# Patient Record
Sex: FEMALE | Race: WHITE | NOT HISPANIC OR LATINO | Employment: OTHER | URBAN - NONMETROPOLITAN AREA
[De-identification: names, ages, dates, MRNs, and addresses within clinical notes are randomized per-mention and may not be internally consistent; named-entity substitution may affect disease eponyms.]

---

## 2017-02-22 NOTE — TELEPHONE ENCOUNTER
Patient had to reschedule appointment for today.  Her next appointment is 3-23-17 and she is requesting refills of her xanax and zoloft.

## 2017-02-23 RX ORDER — SERTRALINE HYDROCHLORIDE 25 MG/1
25 TABLET, FILM COATED ORAL DAILY
Qty: 30 TABLET | Refills: 1 | Status: SHIPPED | OUTPATIENT
Start: 2017-02-23 | End: 2017-03-23 | Stop reason: SDUPTHER

## 2017-02-23 RX ORDER — ALPRAZOLAM 1 MG/1
1 TABLET ORAL 2 TIMES DAILY PRN
Qty: 60 TABLET | Refills: 1 | OUTPATIENT
Start: 2017-02-23 | End: 2017-03-23 | Stop reason: SDUPTHER

## 2017-02-28 ENCOUNTER — DOCUMENTATION (OUTPATIENT)
Dept: PSYCHIATRY | Facility: CLINIC | Age: 82
End: 2017-02-28

## 2017-03-07 ENCOUNTER — HOSPITAL ENCOUNTER (OUTPATIENT)
Dept: HOSPITAL 79 - KOH-I | Age: 82
End: 2017-03-07
Attending: INTERNAL MEDICINE
Payer: MEDICARE

## 2017-03-07 DIAGNOSIS — E11.9: Primary | ICD-10-CM

## 2017-03-07 DIAGNOSIS — R94.4: ICD-10-CM

## 2017-03-23 ENCOUNTER — OFFICE VISIT (OUTPATIENT)
Dept: PSYCHIATRY | Facility: CLINIC | Age: 82
End: 2017-03-23

## 2017-03-23 VITALS
WEIGHT: 112 LBS | SYSTOLIC BLOOD PRESSURE: 124 MMHG | HEIGHT: 65 IN | HEART RATE: 77 BPM | BODY MASS INDEX: 18.66 KG/M2 | DIASTOLIC BLOOD PRESSURE: 67 MMHG

## 2017-03-23 DIAGNOSIS — F41.1 GENERALIZED ANXIETY DISORDER: Primary | ICD-10-CM

## 2017-03-23 DIAGNOSIS — F32.1 MAJOR DEPRESSIVE DISORDER, SINGLE EPISODE, MODERATE (HCC): ICD-10-CM

## 2017-03-23 PROCEDURE — 99213 OFFICE O/P EST LOW 20 MIN: CPT

## 2017-03-23 RX ORDER — MEGESTROL ACETATE 40 MG/ML
SUSPENSION ORAL
Refills: 2 | COMMUNITY
Start: 2017-03-21

## 2017-03-23 RX ORDER — LORATADINE 10 MG/1
TABLET ORAL
Refills: 2 | COMMUNITY
Start: 2017-02-21 | End: 2018-12-19

## 2017-03-23 RX ORDER — SERTRALINE HYDROCHLORIDE 25 MG/1
25 TABLET, FILM COATED ORAL DAILY
Qty: 30 TABLET | Refills: 1 | Status: SHIPPED | OUTPATIENT
Start: 2017-03-23 | End: 2017-09-19 | Stop reason: SDDI

## 2017-03-23 RX ORDER — ALPRAZOLAM 1 MG/1
1 TABLET ORAL 2 TIMES DAILY PRN
Qty: 60 TABLET | Refills: 1
Start: 2017-03-23 | End: 2017-05-24 | Stop reason: SDUPTHER

## 2017-03-23 NOTE — PROGRESS NOTES
"Michael Rivera is a 84 y.o. female who is here today for medication management follow up.    Chief Complaint:  Anxiety and depression    HPI:  History of Present Illness  Still struggles with depression and worries a lot.  She never picked up the Zoloft prescription due to a pharmacy mixup (I sent it to [facility], but she uses [facility] now).  Restorationism is a strong foundation of strength for her. Xanax is the only effective medication she has tried for anxiety, has been on it for decades until PCP left practice recently and she asked me to continue writing them.  She says the Xanax does help some with her anxiety.  I notice that she is repeating herself several times during the interview.  Her daughter-in-law accompanies her today and says she repeats herself a lot.  The family is wondering if she has some Alzheimer's.     Review of Systems   Constitutional: Positive for appetite change.   HENT: Negative.    Respiratory: Negative.    Cardiovascular: Negative.    Gastrointestinal: Negative.    Musculoskeletal: Positive for back pain.       Objective   Physical Exam   Constitutional: She appears well-developed and well-nourished. No distress.   Neurological: She is alert. Coordination and gait normal.   Vitals reviewed.    Blood pressure 124/67, pulse 77, height 65\" (165.1 cm), weight 112 lb (50.8 kg).    No Known Allergies    Current Medications:   Current Outpatient Prescriptions   Medication Sig Dispense Refill   • ALPRAZolam (XANAX) 1 MG tablet Take 1 tablet by mouth 2 (Two) Times a Day As Needed for Anxiety. 60 tablet 1   • loratadine (CLARITIN) 10 MG tablet TAKE 1 Tablet BY MOUTH EVERY DAY  2   • megestrol (MEGACE) 40 MG/ML suspension TAKE 2 teaspoons BY MOUTH TWICE DAILY  2   • sertraline (ZOLOFT) 25 MG tablet Take 1 tablet by mouth Daily. 30 tablet 1     No current facility-administered medications for this visit.        Mental Status Exam:   Appearance: Neatly, casually dressed, good hygeine. " "  Cooperation: Cooperative  Orientation: Ox4  Gait and station stable.   Psychomotor: No psychomotor agitation/retardation, No EPS, No motor tics  Speech: normal rate, amount.  Mood \"a little depressed sometimes\"   Affect: congruent/appropriate.  Thought Content: goal directed, no delusional material present  Thought process: linear, organized.  Suicidality: No SI  Homicidality: No HI   Perception: No AH/VH  Memory is intact for recent and remote events  Concentration: good  Impulse control: good  Insight: fair   Judgement: fair    Assessment/Plan   Problems Addressed this Visit     None      Visit Diagnoses     Generalized anxiety disorder    -  Primary    Relevant Medications    ALPRAZolam (XANAX) 1 MG tablet    sertraline (ZOLOFT) 25 MG tablet    Major depressive disorder, single episode, moderate        Relevant Medications    ALPRAZolam (XANAX) 1 MG tablet    sertraline (ZOLOFT) 25 MG tablet          · Start zoloft 25mg daily for depression/anxiety.  · Continue xanax 1mg BID for anxiety which she has taken for decades.     We discussed risks, benefits, and side effects of the above medication and the patient was agreeable with the plan.    Return in about 2 months (around 5/24/2017) for Next scheduled follow up.  The patient was instructed to call clinic as needed or go to ER if in crisis.           Xenia disclaimer:  Much of this encounter note is an electronic transcription/translation of spoken language to printed text. The electronic translation of spoken language may permit erroneous, or at times, nonsensical words or phrases to be inadvertently transcribed; Although I have reviewed the note for such errors, some may still exist.  "

## 2017-05-24 ENCOUNTER — OFFICE VISIT (OUTPATIENT)
Dept: PSYCHIATRY | Facility: CLINIC | Age: 82
End: 2017-05-24

## 2017-05-24 VITALS
DIASTOLIC BLOOD PRESSURE: 75 MMHG | WEIGHT: 112 LBS | SYSTOLIC BLOOD PRESSURE: 128 MMHG | HEART RATE: 94 BPM | HEIGHT: 65 IN | BODY MASS INDEX: 18.66 KG/M2

## 2017-05-24 DIAGNOSIS — F41.1 GENERALIZED ANXIETY DISORDER: Primary | ICD-10-CM

## 2017-05-24 PROCEDURE — 99213 OFFICE O/P EST LOW 20 MIN: CPT

## 2017-05-24 RX ORDER — TRIPROLIDINE/PSEUDOEPHEDRINE 2.5MG-60MG
TABLET ORAL
Refills: 1 | COMMUNITY
Start: 2017-05-12

## 2017-05-24 RX ORDER — BESIFLOXACIN 6 MG/ML
SUSPENSION OPHTHALMIC
Refills: 1 | COMMUNITY
Start: 2017-05-12

## 2017-05-24 RX ORDER — ACETAMINOPHEN 500 MG
TABLET ORAL
Refills: 0 | COMMUNITY
Start: 2017-05-08

## 2017-05-24 RX ORDER — NEPAFENAC 0.3 %
SUSPENSION, DROPS(FINAL DOSAGE FORM)(ML) OPHTHALMIC (EYE)
Refills: 1 | COMMUNITY
Start: 2017-05-12

## 2017-05-24 RX ORDER — FLUTICASONE PROPIONATE 50 MCG
SPRAY, SUSPENSION (ML) NASAL
Refills: 0 | COMMUNITY
Start: 2017-04-11

## 2017-05-24 RX ORDER — PROMETHAZINE HYDROCHLORIDE 25 MG/1
TABLET ORAL
Refills: 0 | COMMUNITY
Start: 2017-04-11

## 2017-05-24 RX ORDER — CIPROFLOXACIN 250 MG/1
TABLET, FILM COATED ORAL
Refills: 0 | COMMUNITY
Start: 2017-05-19 | End: 2018-12-19

## 2017-05-24 RX ORDER — ALPRAZOLAM 1 MG/1
1 TABLET ORAL 2 TIMES DAILY PRN
Qty: 60 TABLET | Refills: 2
Start: 2017-05-24 | End: 2017-09-19 | Stop reason: SDUPTHER

## 2017-09-19 ENCOUNTER — OFFICE VISIT (OUTPATIENT)
Dept: PSYCHIATRY | Facility: CLINIC | Age: 82
End: 2017-09-19

## 2017-09-19 VITALS
WEIGHT: 106 LBS | BODY MASS INDEX: 17.66 KG/M2 | HEART RATE: 80 BPM | HEIGHT: 65 IN | SYSTOLIC BLOOD PRESSURE: 131 MMHG | DIASTOLIC BLOOD PRESSURE: 68 MMHG

## 2017-09-19 DIAGNOSIS — F41.1 GENERALIZED ANXIETY DISORDER: Primary | ICD-10-CM

## 2017-09-19 DIAGNOSIS — F33.0 MDD (MAJOR DEPRESSIVE DISORDER), RECURRENT EPISODE, MILD (HCC): ICD-10-CM

## 2017-09-19 PROCEDURE — 99213 OFFICE O/P EST LOW 20 MIN: CPT | Performed by: NURSE PRACTITIONER

## 2017-09-19 RX ORDER — ALPRAZOLAM 1 MG/1
1 TABLET ORAL 2 TIMES DAILY PRN
Qty: 60 TABLET | Refills: 0 | Status: SHIPPED | OUTPATIENT
Start: 2017-09-19 | End: 2017-10-23 | Stop reason: SDUPTHER

## 2017-09-19 RX ORDER — AMLODIPINE BESYLATE 5 MG/1
TABLET ORAL
Refills: 2 | COMMUNITY
Start: 2017-08-22

## 2017-09-19 NOTE — PROGRESS NOTES
"Michael Rivera is a 85 y.o. female who is here today for medication management follow up.    Chief Complaint:  Anxiety and depression    HPI:  History of Present Illness  Patient presents for follow up from dr. Gomez.  Patient returns today reporting that she is doing fairly well.  She continues to struggle with anxiety she has been on Xanax at least for the last 40 years.  She adamantly and consistently denies any thoughts to harm herself or others.  She is alert oriented.  She currently states she is highly involved in [employer] and that her relationship with God is her primary strength.  Patient denies any medication related side effects.  Per Dr. Gomez's notes and the patient's report Xanax is the only effective medication she has tried for anxiety, has been on it for decades until PCP left practice recently and she asked me to continue writing them.  She says the Xanax does help some with her anxiety. Her daughter-in-law accompanies her today and says she is doing well.  Patient's weight was noted she appears to have lost 6 pounds over the summer.  She does report difficulty with appetite and states that she \"has a bad hip and the pain makes me not want to eat\".  Patient adamantly denies any thoughts to harm herself or others she reports that she would never get that low.  Long discussion was held with the patient in regards to risk of long-term benzodiazepine use patient was reluctant to attempt other medication she has been stable on Xanax for at least the last 30 years.    Review of Systems   Constitutional: Positive for appetite change.   HENT: Negative.    Respiratory: Negative.    Cardiovascular: Negative.    Gastrointestinal: Negative.    Musculoskeletal: Positive for back pain.       Objective   Physical Exam   Constitutional: She appears well-developed and well-nourished. No distress.   Neurological: She is alert. Coordination and gait normal.   Vitals reviewed.    There were no vitals taken " "for this visit.    No Known Allergies    Current Medications:   Current Outpatient Prescriptions   Medication Sig Dispense Refill   • ALPRAZolam (XANAX) 1 MG tablet Take 1 tablet by mouth 2 (Two) Times a Day As Needed for Anxiety. 60 tablet 2   • amLODIPine (NORVASC) 5 MG tablet TAKE ONE Tablet BY MOUTH EVERY MORNING  2   • BESIVANCE 0.6 % suspension ophthalmic suspension USE 1 drop IN surgical EYE THREE TIMES DAILY  1   • ciprofloxacin (CIPRO) 250 MG tablet TAKE ONE TABLET BY MOUTH EVERY TWELVE HOURS UNTIL GONE  0   • DUREZOL 0.05 % ophthalmic emulsion USE 1 drop IN surgical EYE THREE TIMES DAILY  1   • fluticasone (FLONASE) 50 MCG/ACT nasal spray USE two SPRAYS IN EACH nostril EVERY DAY  0   • ILEVRO 0.3 % suspension USE 1 drop IN surgical EYE EVERY DAY  1   • loratadine (CLARITIN) 10 MG tablet TAKE 1 Tablet BY MOUTH EVERY DAY  2   • MAPAP 500 MG tablet TAKE 1 Tablet BY MOUTH EVERY DAY AS NEEDED FOR PAIN  0   • megestrol (MEGACE) 40 MG/ML suspension TAKE 2 teaspoons BY MOUTH TWICE DAILY  2   • promethazine (PHENERGAN) 25 MG tablet TAKE 1/2 TO 1 tablet BY MOUTH EVERY 4 TO 6 HOURS AS NEEDED  0   • sertraline (ZOLOFT) 25 MG tablet Take 1 tablet by mouth Daily. 30 tablet 1     No current facility-administered medications for this visit.        Mental Status Exam:   Appearance: Neatly, casually dressed, good hygeine.   Cooperation: Cooperative  Orientation: Ox4  Gait and station stable.   Psychomotor: No psychomotor agitation/retardation, No EPS, No motor tics  Speech: normal rate, amount.  Mood \"pretty good\"   Affect: congruent/appropriate.  Thought Content: goal directed, no delusional material present  Thought process: linear, organized.  Suicidality: No SI  Homicidality: No HI   Perception: No AH/VH  Memory is intact for recent and remote events  Concentration: good  Impulse control: good  Insight: fair   Judgement: fair    Assessment/Plan   Problems Addressed this Visit     None      Visit Diagnoses     " Generalized anxiety disorder    -  Primary    Relevant Medications    ALPRAZolam (XANAX) 1 MG tablet    MDD (major depressive disorder), recurrent episode, mild        Relevant Medications    ALPRAZolam (XANAX) 1 MG tablet          · Continue xanax 1mg BID for anxiety which she has taken for decades.    · Watch for any worsening of s/s of dementia.   · She will follow-up with one of our nurse practitioners in about 2 months for medication management.    We discussed risks, benefits, and side effects of the above medication and the patient was agreeable with the plan.    Return in about 2 months (around 11/19/2017).  The patient was instructed to call clinic as needed or go to ER if in crisis.           Xenia disclaimer:  Much of this encounter note is an electronic transcription/translation of spoken language to printed text. The electronic translation of spoken language may permit erroneous, or at times, nonsensical words or phrases to be inadvertently transcribed; Although I have reviewed the note for such errors, some may still exist.

## 2017-10-24 RX ORDER — ALPRAZOLAM 1 MG/1
1 TABLET ORAL 2 TIMES DAILY PRN
Qty: 60 TABLET | Refills: 0 | Status: SHIPPED | OUTPATIENT
Start: 2017-10-24 | End: 2017-11-21 | Stop reason: SDUPTHER

## 2017-10-24 NOTE — TELEPHONE ENCOUNTER
Called in [facility]   Xanax 60 tablets take 1 tablets by mouth 2 times a day 0 refills

## 2017-11-21 ENCOUNTER — TELEPHONE (OUTPATIENT)
Dept: PSYCHIATRY | Facility: CLINIC | Age: 82
End: 2017-11-21

## 2017-11-21 RX ORDER — ALPRAZOLAM 1 MG/1
1 TABLET ORAL 2 TIMES DAILY PRN
Qty: 60 TABLET | Refills: 0 | Status: SHIPPED | OUTPATIENT
Start: 2017-11-21 | End: 2017-12-13 | Stop reason: SDUPTHER

## 2017-12-13 RX ORDER — ALPRAZOLAM 1 MG/1
1 TABLET ORAL 2 TIMES DAILY PRN
Qty: 60 TABLET | Refills: 0 | Status: SHIPPED | OUTPATIENT
Start: 2017-12-13 | End: 2018-01-24 | Stop reason: SDUPTHER

## 2017-12-13 RX ORDER — PREDNISOLONE ACETATE 10 MG/ML
SUSPENSION/ DROPS OPHTHALMIC
Refills: 0 | COMMUNITY
Start: 2017-10-05

## 2018-01-24 ENCOUNTER — DOCUMENTATION (OUTPATIENT)
Dept: PSYCHIATRY | Facility: CLINIC | Age: 83
End: 2018-01-24

## 2018-01-24 ENCOUNTER — OFFICE VISIT (OUTPATIENT)
Dept: PSYCHIATRY | Facility: CLINIC | Age: 83
End: 2018-01-24

## 2018-01-24 VITALS
DIASTOLIC BLOOD PRESSURE: 60 MMHG | WEIGHT: 112 LBS | HEIGHT: 65 IN | BODY MASS INDEX: 18.66 KG/M2 | HEART RATE: 88 BPM | SYSTOLIC BLOOD PRESSURE: 160 MMHG

## 2018-01-24 DIAGNOSIS — F33.0 MDD (MAJOR DEPRESSIVE DISORDER), RECURRENT EPISODE, MILD (HCC): ICD-10-CM

## 2018-01-24 DIAGNOSIS — F41.1 GENERALIZED ANXIETY DISORDER: Primary | ICD-10-CM

## 2018-01-24 PROCEDURE — 99214 OFFICE O/P EST MOD 30 MIN: CPT | Performed by: NURSE PRACTITIONER

## 2018-01-24 RX ORDER — ALPRAZOLAM 1 MG/1
1 TABLET ORAL NIGHTLY
Qty: 30 TABLET | Refills: 0 | Status: SHIPPED | OUTPATIENT
Start: 2018-01-24 | End: 2018-02-21 | Stop reason: SDUPTHER

## 2018-01-24 RX ORDER — ALPRAZOLAM 0.5 MG/1
0.5 TABLET ORAL EVERY MORNING
Qty: 30 TABLET | Refills: 0 | Status: SHIPPED | OUTPATIENT
Start: 2018-01-24 | End: 2018-02-21 | Stop reason: SDUPTHER

## 2018-01-24 NOTE — PROGRESS NOTES
"Michael Rivera is a 85 y.o. female who is here today for medication management follow up.    Chief Complaint:  Anxiety and depression    HPI:  History of Present Illness   Patient states that she had a good Tyron. She presents to the visit with her daughter Marta, whom she lives with. My nerves are \"bad. I stay depressed a lot, and hurts a lot.\" Continues to go to [employer] and stays busy. Shares that she cooks some. Daughter shares that she gets up early, some mornings she will do it herself. During the day she \"piddles in her room\" She is able to care for herself. She seems to be getting forgetful. This has gradually worsened over the past year. She does not take any medication for her memory. Appetite: appetite is good. Sleeps pretty good. Shares that at time she has a hard time falling asleep, she gets up frequently related to her \"kidneys acting up.\" Shares that she has been recently hearing things like her name being called, and is seeing things, such as her daughter coming in her room this morning to wake her up. Denies any new medical problems. Self harm and suicidal thoughts not voiced. There does not seem to be a family history of memory issues. Patient was asked to draw a clock that read 4:00 she anali a [place] and wrote 4 o'clock. She does appear to have memory loss at times.      Review of Systems   Constitutional: Positive for appetite change.   HENT: Negative.    Respiratory: Negative.    Cardiovascular: Negative.    Gastrointestinal: Negative.    Musculoskeletal: Positive for arthralgias, back pain and gait problem.        Patient had difficulty walking to the exam room.      Neurological: Positive for weakness.       Objective   Physical Exam   Constitutional: She appears well-developed and well-nourished. No distress.   Neurological: She is alert. Coordination abnormal. Gait normal.   Skin: Skin is warm and dry.   Vitals reviewed.    Blood pressure 160/60, pulse 88, height 165.1 cm " "(65\"), weight 50.8 kg (112 lb).    No Known Allergies    Current Medications:   Current Outpatient Prescriptions   Medication Sig Dispense Refill   • ALPRAZolam (XANAX) 1 MG tablet Take 1 tablet by mouth Every Night. 30 tablet 0   • amLODIPine (NORVASC) 5 MG tablet TAKE ONE Tablet BY MOUTH EVERY MORNING  2   • BESIVANCE 0.6 % suspension ophthalmic suspension USE 1 drop IN surgical EYE THREE TIMES DAILY  1   • ciprofloxacin (CIPRO) 250 MG tablet TAKE ONE TABLET BY MOUTH EVERY TWELVE HOURS UNTIL GONE  0   • DUREZOL 0.05 % ophthalmic emulsion USE 1 drop IN surgical EYE THREE TIMES DAILY  1   • fluticasone (FLONASE) 50 MCG/ACT nasal spray USE two SPRAYS IN EACH nostril EVERY DAY  0   • ILEVRO 0.3 % suspension USE 1 drop IN surgical EYE EVERY DAY  1   • loratadine (CLARITIN) 10 MG tablet TAKE 1 Tablet BY MOUTH EVERY DAY  2   • MAPAP 500 MG tablet TAKE 1 Tablet BY MOUTH EVERY DAY AS NEEDED FOR PAIN  0   • megestrol (MEGACE) 40 MG/ML suspension TAKE 2 teaspoons BY MOUTH TWICE DAILY  2   • prednisoLONE acetate (PRED FORTE) 1 % ophthalmic suspension instill 1 drop into surgical EYE FOUR TIMES DAILY AS directed  0   • promethazine (PHENERGAN) 25 MG tablet TAKE 1/2 TO 1 tablet BY MOUTH EVERY 4 TO 6 HOURS AS NEEDED  0   • ALPRAZolam (XANAX) 0.5 MG tablet Take 1 tablet by mouth Every Morning. 30 tablet 0     No current facility-administered medications for this visit.        Mental Status Exam:   Appearance: Neatly, casually dressed, good hygeine.   Cooperation: Cooperative  Orientation: Ox4  Gait and station stable.   Psychomotor: No psychomotor agitation/retardation, No EPS, No motor tics  Speech: normal rate, amount.  Mood \"pretty good\"   Affect: congruent/appropriate.  Thought Content: goal directed, no delusional material present  Thought process: linear, organized.  Suicidality: No SI  Homicidality: No HI   Perception: No AH/VH  Memory: some immediate memory appears to impaired.  Concentration: good  Impulse control: " good  Insight: fair   Judgement: fair    Assessment/Plan   Problems Addressed this Visit     None      Visit Diagnoses     Generalized anxiety disorder    -  Primary    Relevant Medications    ALPRAZolam (XANAX) 1 MG tablet    ALPRAZolam (XANAX) 0.5 MG tablet    MDD (major depressive disorder), recurrent episode, mild        Relevant Medications    ALPRAZolam (XANAX) 1 MG tablet    ALPRAZolam (XANAX) 0.5 MG tablet        Patient is becoming forgetful and is very repetitive. She does appear to have memory loss at times. Xanax will be decreased slowly and decreasing morning dose by 0.5 mg. Discussed with daughter the need to rally family and contemplate potential for the need of POA. Daughter is currently with patient 24/7 and is assistanting in financial matters.  Will contact Dr. Richey/jose de jesus regarding aricept samples-beginning treatment with medication to halt/stablize memory.  We discussed risks, benefits, and side effects of the above medication and the patient was agreeable with the plan.    Return in about 4 weeks (around 2/21/2018), or if symptoms worsen or fail to improve, for Next scheduled follow up.  The patient was instructed to call clinic as needed or go to ER if in crisis.           Xenia disclaimer:  Much of this encounter note is an electronic transcription/translation of spoken language to printed text. The electronic translation of spoken language may permit erroneous, or at times, nonsensical words or phrases to be inadvertently transcribed; Although I have reviewed the note for such errors, some may still exist.

## 2018-02-21 ENCOUNTER — OFFICE VISIT (OUTPATIENT)
Dept: PSYCHIATRY | Facility: CLINIC | Age: 83
End: 2018-02-21

## 2018-02-21 VITALS
SYSTOLIC BLOOD PRESSURE: 134 MMHG | BODY MASS INDEX: 18.66 KG/M2 | DIASTOLIC BLOOD PRESSURE: 60 MMHG | HEIGHT: 65 IN | WEIGHT: 112 LBS | HEART RATE: 89 BPM

## 2018-02-21 DIAGNOSIS — F41.1 GENERALIZED ANXIETY DISORDER: Primary | ICD-10-CM

## 2018-02-21 DIAGNOSIS — F33.0 MDD (MAJOR DEPRESSIVE DISORDER), RECURRENT EPISODE, MILD (HCC): ICD-10-CM

## 2018-02-21 PROCEDURE — 99213 OFFICE O/P EST LOW 20 MIN: CPT | Performed by: NURSE PRACTITIONER

## 2018-02-21 RX ORDER — ALPRAZOLAM 1 MG/1
1 TABLET ORAL NIGHTLY
Qty: 30 TABLET | Refills: 0 | Status: SHIPPED | OUTPATIENT
Start: 2018-02-21 | End: 2018-03-20 | Stop reason: SDUPTHER

## 2018-02-21 RX ORDER — ALPRAZOLAM 0.5 MG/1
0.5 TABLET ORAL EVERY MORNING
Qty: 30 TABLET | Refills: 0 | Status: SHIPPED | OUTPATIENT
Start: 2018-02-21 | End: 2018-03-20 | Stop reason: SDUPTHER

## 2018-02-21 RX ORDER — AMOXICILLIN 500 MG/1
CAPSULE ORAL
Refills: 0 | COMMUNITY
Start: 2018-02-06 | End: 2018-12-19

## 2018-02-21 RX ORDER — RANITIDINE 300 MG/1
TABLET ORAL
Refills: 2 | COMMUNITY
Start: 2018-02-06

## 2018-02-21 NOTE — PROGRESS NOTES
"Michael Rivera is a 85 y.o. female who is here today for medication management follow up.    Chief Complaint:  Anxiety and depression    HPI:  History of Present Illness   Patient states that she is doing ok-\"the Lord's help\".  My nerves are \"bad. I worry a lot about my grandson.  Continues to go to [employer] and stays busy. Shares that she cooks some.  The patient does continue to confabulate somewhat she was not receptive to any issues with her memory.  The patient was alert she was oriented to person place and situation time and date.  Patient did know the month however stated that the year \"she didn't keep up with\".  She was denying any memory loss although she does appear times to be repeating herself.  Patient reports that she knows her memory is good because she can't \"remember all my medicine\".  She seems to be getting forgetful.  We did attempt to contact her PCP for possible samples of Aricept attempts were tried ×2.  Daughter has verbalized that patient cannot afford any more medication.  Appetite: appetite is good. Sleeps pretty good.  Patient denies any auditory or visual hallucinations.  She adamantly denied any thoughts to harm herself or others    Review of Systems   Constitutional: Positive for appetite change.   HENT: Negative.    Respiratory: Negative.    Cardiovascular: Negative.    Gastrointestinal: Negative.    Musculoskeletal: Positive for arthralgias, back pain and gait problem.        Patient had difficulty walking to the exam room.      Neurological: Positive for weakness.       Objective   Physical Exam   Constitutional: She appears well-developed and well-nourished. No distress.   Neurological: She is alert. Coordination abnormal. Gait normal.   Skin: Skin is warm and dry.   Vitals reviewed.    Blood pressure 134/60, pulse 89, height 165.1 cm (65\"), weight 50.8 kg (112 lb).    No Known Allergies    Current Medications:   Current Outpatient Prescriptions   Medication Sig Dispense " "Refill   • ALPRAZolam (XANAX) 0.5 MG tablet Take 1 tablet by mouth Every Morning. 30 tablet 0   • ALPRAZolam (XANAX) 1 MG tablet Take 1 tablet by mouth Every Night. 30 tablet 0   • amLODIPine (NORVASC) 5 MG tablet TAKE ONE Tablet BY MOUTH EVERY MORNING  2   • BESIVANCE 0.6 % suspension ophthalmic suspension USE 1 drop IN surgical EYE THREE TIMES DAILY  1   • ciprofloxacin (CIPRO) 250 MG tablet TAKE ONE TABLET BY MOUTH EVERY TWELVE HOURS UNTIL GONE  0   • DUREZOL 0.05 % ophthalmic emulsion USE 1 drop IN surgical EYE THREE TIMES DAILY  1   • fluticasone (FLONASE) 50 MCG/ACT nasal spray USE two SPRAYS IN EACH nostril EVERY DAY  0   • ILEVRO 0.3 % suspension USE 1 drop IN surgical EYE EVERY DAY  1   • loratadine (CLARITIN) 10 MG tablet TAKE 1 Tablet BY MOUTH EVERY DAY  2   • MAPAP 500 MG tablet TAKE 1 Tablet BY MOUTH EVERY DAY AS NEEDED FOR PAIN  0   • megestrol (MEGACE) 40 MG/ML suspension TAKE 2 teaspoons BY MOUTH TWICE DAILY  2   • prednisoLONE acetate (PRED FORTE) 1 % ophthalmic suspension instill 1 drop into surgical EYE FOUR TIMES DAILY AS directed  0   • promethazine (PHENERGAN) 25 MG tablet TAKE 1/2 TO 1 tablet BY MOUTH EVERY 4 TO 6 HOURS AS NEEDED  0     No current facility-administered medications for this visit.        Mental Status Exam:   Appearance: Neatly, casually dressed, good hygeine.   Cooperation: Cooperative  Orientation: Ox4  Gait and station stable.   Psychomotor: No psychomotor agitation/retardation, No EPS, No motor tics  Speech: normal rate, amount.  Mood \"pretty good\"   Affect: congruent/appropriate.  Thought Content: goal directed, no delusional material present  Thought process: linear, organized.  Suicidality: No SI  Homicidality: No HI   Perception: No AH/VH  Memory: some immediate memory appears to impaired.  Concentration: good  Impulse control: good  Insight: fair   Judgement: fair    Assessment/Plan   Problems Addressed this Visit     None      Visit Diagnoses     Generalized anxiety " disorder    -  Primary    Relevant Medications    ALPRAZolam (XANAX) 0.5 MG tablet    ALPRAZolam (XANAX) 1 MG tablet    MDD (major depressive disorder), recurrent episode, mild        Relevant Medications    ALPRAZolam (XANAX) 0.5 MG tablet    ALPRAZolam (XANAX) 1 MG tablet      Xanax has been decreased patient presents currently alone with family in the lobby.  Daughter is currently with patient 24/7 and is assistanting in financial matters.  We discussed risks, benefits, and side effects of the above medication and the patient was agreeable with the plan.    Return in about 3 months (around 5/21/2018).  The patient was instructed to call clinic as needed or go to ER if in crisis.           Xenia disclaimer:  Much of this encounter note is an electronic transcription/translation of spoken language to printed text. The electronic translation of spoken language may permit erroneous, or at times, nonsensical words or phrases to be inadvertently transcribed; Although I have reviewed the note for such errors, some may still exist.

## 2018-03-20 DIAGNOSIS — F41.1 GENERALIZED ANXIETY DISORDER: ICD-10-CM

## 2018-03-20 DIAGNOSIS — F33.0 MDD (MAJOR DEPRESSIVE DISORDER), RECURRENT EPISODE, MILD (HCC): ICD-10-CM

## 2018-03-20 RX ORDER — ALPRAZOLAM 1 MG/1
1 TABLET ORAL NIGHTLY
Qty: 30 TABLET | Refills: 0 | Status: SHIPPED | OUTPATIENT
Start: 2018-03-20 | End: 2018-03-20 | Stop reason: SDUPTHER

## 2018-03-20 RX ORDER — ALPRAZOLAM 0.5 MG/1
0.5 TABLET ORAL EVERY MORNING
Qty: 30 TABLET | Refills: 0 | Status: SHIPPED | OUTPATIENT
Start: 2018-03-20 | End: 2018-04-16 | Stop reason: SDUPTHER

## 2018-03-20 RX ORDER — ALPRAZOLAM 0.5 MG/1
0.5 TABLET ORAL EVERY MORNING
Qty: 30 TABLET | Refills: 0 | Status: SHIPPED | OUTPATIENT
Start: 2018-03-20 | End: 2018-03-20 | Stop reason: SDUPTHER

## 2018-03-20 RX ORDER — ALPRAZOLAM 1 MG/1
1 TABLET ORAL NIGHTLY
Qty: 30 TABLET | Refills: 0 | Status: SHIPPED | OUTPATIENT
Start: 2018-03-20 | End: 2018-04-16 | Stop reason: SDUPTHER

## 2018-04-02 ENCOUNTER — TRANSCRIBE ORDERS (OUTPATIENT)
Dept: ADMINISTRATIVE | Facility: HOSPITAL | Age: 83
End: 2018-04-02

## 2018-04-02 DIAGNOSIS — M47.817 LUMBOSACRAL SPONDYLOSIS WITHOUT MYELOPATHY: Primary | ICD-10-CM

## 2018-04-10 ENCOUNTER — HOSPITAL ENCOUNTER (OUTPATIENT)
Dept: GENERAL RADIOLOGY | Facility: HOSPITAL | Age: 83
Discharge: HOME | End: 2018-04-10
Attending: ANESTHESIOLOGY

## 2018-04-10 ENCOUNTER — HOSPITAL ENCOUNTER (OUTPATIENT)
Dept: MRI IMAGING | Facility: HOSPITAL | Age: 83
Discharge: HOME | End: 2018-04-10
Attending: ANESTHESIOLOGY | Admitting: ANESTHESIOLOGY

## 2018-04-10 ENCOUNTER — TRANSCRIBE ORDERS (OUTPATIENT)
Dept: ADMINISTRATIVE | Facility: HOSPITAL | Age: 83
End: 2018-04-10

## 2018-04-10 DIAGNOSIS — M47.815 OSTEOARTHRITIS OF THORACOLUMBAR SPINE, UNSPECIFIED SPINAL OSTEOARTHRITIS COMPLICATION STATUS: Primary | ICD-10-CM

## 2018-04-10 DIAGNOSIS — M47.817 LUMBOSACRAL SPONDYLOSIS WITHOUT MYELOPATHY: ICD-10-CM

## 2018-04-10 DIAGNOSIS — M47.815 OSTEOARTHRITIS OF THORACOLUMBAR SPINE, UNSPECIFIED SPINAL OSTEOARTHRITIS COMPLICATION STATUS: ICD-10-CM

## 2018-04-10 PROCEDURE — 72100 X-RAY EXAM L-S SPINE 2/3 VWS: CPT

## 2018-04-10 PROCEDURE — 72072 X-RAY EXAM THORAC SPINE 3VWS: CPT

## 2018-04-10 PROCEDURE — 72072 X-RAY EXAM THORAC SPINE 3VWS: CPT | Performed by: RADIOLOGY

## 2018-04-10 PROCEDURE — 72148 MRI LUMBAR SPINE W/O DYE: CPT | Performed by: RADIOLOGY

## 2018-04-10 PROCEDURE — 72100 X-RAY EXAM L-S SPINE 2/3 VWS: CPT | Performed by: RADIOLOGY

## 2018-04-10 PROCEDURE — 72148 MRI LUMBAR SPINE W/O DYE: CPT

## 2018-04-16 DIAGNOSIS — F41.1 GENERALIZED ANXIETY DISORDER: ICD-10-CM

## 2018-04-16 DIAGNOSIS — F33.0 MDD (MAJOR DEPRESSIVE DISORDER), RECURRENT EPISODE, MILD (HCC): ICD-10-CM

## 2018-04-16 RX ORDER — ALPRAZOLAM 1 MG/1
1 TABLET ORAL NIGHTLY
Qty: 30 TABLET | Refills: 0 | Status: SHIPPED | OUTPATIENT
Start: 2018-04-16 | End: 2018-05-07 | Stop reason: SDUPTHER

## 2018-04-16 RX ORDER — ALPRAZOLAM 0.5 MG/1
0.5 TABLET ORAL EVERY MORNING
Qty: 30 TABLET | Refills: 0 | Status: SHIPPED | OUTPATIENT
Start: 2018-04-16 | End: 2018-05-07 | Stop reason: SDUPTHER

## 2018-05-07 DIAGNOSIS — F41.1 GENERALIZED ANXIETY DISORDER: ICD-10-CM

## 2018-05-07 DIAGNOSIS — F33.0 MDD (MAJOR DEPRESSIVE DISORDER), RECURRENT EPISODE, MILD (HCC): ICD-10-CM

## 2018-05-07 RX ORDER — ALPRAZOLAM 1 MG/1
1 TABLET ORAL NIGHTLY
Qty: 30 TABLET | Refills: 0 | Status: SHIPPED | OUTPATIENT
Start: 2018-05-07 | End: 2018-06-05 | Stop reason: SDUPTHER

## 2018-05-07 RX ORDER — ALPRAZOLAM 0.5 MG/1
0.5 TABLET ORAL EVERY MORNING
Qty: 30 TABLET | Refills: 0 | Status: SHIPPED | OUTPATIENT
Start: 2018-05-07 | End: 2018-06-05 | Stop reason: SDUPTHER

## 2018-06-05 DIAGNOSIS — F41.1 GENERALIZED ANXIETY DISORDER: ICD-10-CM

## 2018-06-05 DIAGNOSIS — F33.0 MDD (MAJOR DEPRESSIVE DISORDER), RECURRENT EPISODE, MILD (HCC): ICD-10-CM

## 2018-06-06 RX ORDER — ALPRAZOLAM 0.5 MG/1
0.5 TABLET ORAL EVERY MORNING
Qty: 30 TABLET | Refills: 0 | Status: SHIPPED | OUTPATIENT
Start: 2018-06-06 | End: 2018-06-20 | Stop reason: SDUPTHER

## 2018-06-06 RX ORDER — ALPRAZOLAM 1 MG/1
1 TABLET ORAL NIGHTLY
Qty: 30 TABLET | Refills: 0 | Status: SHIPPED | OUTPATIENT
Start: 2018-06-06 | End: 2018-06-20 | Stop reason: SDUPTHER

## 2018-06-20 ENCOUNTER — OFFICE VISIT (OUTPATIENT)
Dept: PSYCHIATRY | Facility: CLINIC | Age: 83
End: 2018-06-20

## 2018-06-20 VITALS
HEIGHT: 65 IN | TEMPERATURE: 97.9 F | HEART RATE: 68 BPM | DIASTOLIC BLOOD PRESSURE: 68 MMHG | WEIGHT: 112 LBS | BODY MASS INDEX: 18.66 KG/M2 | OXYGEN SATURATION: 96 % | SYSTOLIC BLOOD PRESSURE: 110 MMHG

## 2018-06-20 DIAGNOSIS — F33.0 MDD (MAJOR DEPRESSIVE DISORDER), RECURRENT EPISODE, MILD (HCC): ICD-10-CM

## 2018-06-20 DIAGNOSIS — F41.1 GENERALIZED ANXIETY DISORDER: ICD-10-CM

## 2018-06-20 PROCEDURE — 99213 OFFICE O/P EST LOW 20 MIN: CPT | Performed by: NURSE PRACTITIONER

## 2018-06-20 RX ORDER — ALPRAZOLAM 0.5 MG/1
0.5 TABLET ORAL EVERY MORNING
Qty: 30 TABLET | Refills: 0 | Status: SHIPPED | OUTPATIENT
Start: 2018-06-20 | End: 2018-07-09 | Stop reason: SDUPTHER

## 2018-06-20 RX ORDER — LIDOCAINE 50 MG/G
OINTMENT TOPICAL
Refills: 5 | COMMUNITY
Start: 2018-05-01

## 2018-06-20 RX ORDER — HYDROCODONE BITARTRATE AND ACETAMINOPHEN 5; 325 MG/1; MG/1
TABLET ORAL
Refills: 0 | COMMUNITY
Start: 2018-06-12

## 2018-06-20 RX ORDER — ALPRAZOLAM 1 MG/1
1 TABLET ORAL NIGHTLY
Qty: 30 TABLET | Refills: 0 | Status: SHIPPED | OUTPATIENT
Start: 2018-06-20 | End: 2018-07-09 | Stop reason: SDUPTHER

## 2018-06-20 NOTE — PROGRESS NOTES
"Michael Rivera is a 86 y.o. female who is here today for medication management follow up.    Chief Complaint:  Anxiety and depression    HPI:  History of Present Illness   Patient states that she is doing ok-\"the Lord's help\".  My nerves are \"bad. I worry a lot about my family.  Continues to go to [employer] and stays busy. Shares that she cooks some.  The patient does continue to confabulate somewhat she was not receptive to any issues with her memory.  The patient was alert she was oriented to person place and situation time and date.  We did attempt to contact her PCP for possible samples of Aricept attempts were tried ×2.  Daughter has verbalized that patient cannot afford any more medication.  Appetite: appetite is good. Sleeps pretty good.  Patient denies any auditory or visual hallucinations.  She adamantly denied any thoughts to harm herself or others    Review of Systems   Constitutional: Positive for appetite change.   HENT: Negative.    Respiratory: Negative.    Cardiovascular: Negative.    Gastrointestinal: Negative.    Musculoskeletal: Positive for arthralgias, back pain and gait problem.        Patient had difficulty walking to the exam room.      Neurological: Positive for weakness.       Objective   Physical Exam   Constitutional: She appears well-developed and well-nourished. No distress.   Neurological: She is alert. Coordination abnormal. Gait normal.   Skin: Skin is warm and dry.   Vitals reviewed.    Blood pressure 110/68, pulse 68, temperature 97.9 °F (36.6 °C), height 165.1 cm (65\"), weight 50.8 kg (112 lb), SpO2 96 %.    No Known Allergies    Current Medications:   Current Outpatient Prescriptions   Medication Sig Dispense Refill   • ALPRAZolam (XANAX) 0.5 MG tablet Take 1 tablet by mouth Every Morning. 30 tablet 0   • ALPRAZolam (XANAX) 1 MG tablet Take 1 tablet by mouth Every Night. 30 tablet 0   • amLODIPine (NORVASC) 5 MG tablet TAKE ONE Tablet BY MOUTH EVERY MORNING  2   • " "amoxicillin (AMOXIL) 500 MG capsule TAKE TWO CAPSULES BY MOUTH TWICE DAILY UNTIL GONE  0   • BESIVANCE 0.6 % suspension ophthalmic suspension USE 1 drop IN surgical EYE THREE TIMES DAILY  1   • Cholecalciferol (VITAMIN D3) 5000 units capsule capsule TAKE ONE CAPSULE BY MOUTH EVERY DAY  2   • ciprofloxacin (CIPRO) 250 MG tablet TAKE ONE TABLET BY MOUTH EVERY TWELVE HOURS UNTIL GONE  0   • Diclofenac Sodium 1.5 % solution apply 10 TO 20 DROPS TO THE affected area(S) UP TO 5 TIMES PER DAY AS NEEDED AS DIRECTED  5   • DUREZOL 0.05 % ophthalmic emulsion USE 1 drop IN surgical EYE THREE TIMES DAILY  1   • fluticasone (FLONASE) 50 MCG/ACT nasal spray USE two SPRAYS IN EACH nostril EVERY DAY  0   • HYDROcodone-acetaminophen (NORCO) 5-325 MG per tablet TAKE 1/2 TO 1 TABLET BY MOUTH THREE TIMES DAILY AS NEEDED FOR PAIN  0   • ILEVRO 0.3 % suspension USE 1 drop IN surgical EYE EVERY DAY  1   • lidocaine (XYLOCAINE) 5 % ointment APPLY TO THE AFFECTED AREA 3 TO 4 TIMES PER DAY AS directed  5   • loratadine (CLARITIN) 10 MG tablet TAKE 1 Tablet BY MOUTH EVERY DAY  2   • MAPAP 500 MG tablet TAKE 1 Tablet BY MOUTH EVERY DAY AS NEEDED FOR PAIN  0   • megestrol (MEGACE) 40 MG/ML suspension TAKE 2 teaspoons BY MOUTH TWICE DAILY  2   • prednisoLONE acetate (PRED FORTE) 1 % ophthalmic suspension instill 1 drop into surgical EYE FOUR TIMES DAILY AS directed  0   • promethazine (PHENERGAN) 25 MG tablet TAKE 1/2 TO 1 tablet BY MOUTH EVERY 4 TO 6 HOURS AS NEEDED  0   • raNITIdine (ZANTAC) 300 MG tablet TAKE ONE TABLET BY MOUTH TWICE DAILY  2     No current facility-administered medications for this visit.        Mental Status Exam:   Appearance: Neatly, casually dressed, good hygeine.   Cooperation: Cooperative  Orientation: Ox4  Gait and station stable.   Psychomotor: No psychomotor agitation/retardation, No EPS, No motor tics  Speech: normal rate, amount.  Mood \"pretty good\"   Affect: congruent/appropriate.  Thought Content: goal " directed, no delusional material present  Thought process: linear, organized.  Suicidality: No SI  Homicidality: No HI   Perception: No AH/VH  Memory: some immediate memory appears to impaired.  Concentration: good  Impulse control: good  Insight: fair   Judgement: fair    Assessment/Plan   Problems Addressed this Visit     None      Visit Diagnoses     Generalized anxiety disorder        Relevant Medications    ALPRAZolam (XANAX) 0.5 MG tablet    ALPRAZolam (XANAX) 1 MG tablet    MDD (major depressive disorder), recurrent episode, mild        Relevant Medications    ALPRAZolam (XANAX) 0.5 MG tablet    ALPRAZolam (XANAX) 1 MG tablet          Return in about 3 months (around 9/20/2018).  The patient was instructed to call clinic as needed or go to ER if in crisis.           Xenia disclaimer:  Much of this encounter note is an electronic transcription/translation of spoken language to printed text. The electronic translation of spoken language may permit erroneous, or at times, nonsensical words or phrases to be inadvertently transcribed; Although I have reviewed the note for such errors, some may still exist.

## 2018-07-02 ENCOUNTER — APPOINTMENT (OUTPATIENT)
Dept: BONE DENSITY | Facility: HOSPITAL | Age: 83
End: 2018-07-02

## 2018-07-05 ENCOUNTER — HOSPITAL ENCOUNTER (OUTPATIENT)
Dept: BONE DENSITY | Facility: HOSPITAL | Age: 83
Discharge: HOME | End: 2018-07-05
Admitting: PHYSICIAN ASSISTANT

## 2018-07-05 DIAGNOSIS — Z78.0 POSTMENOPAUSAL: ICD-10-CM

## 2018-07-05 PROCEDURE — 77080 DXA BONE DENSITY AXIAL: CPT | Performed by: RADIOLOGY

## 2018-07-05 PROCEDURE — 77080 DXA BONE DENSITY AXIAL: CPT

## 2018-07-09 ENCOUNTER — EPISODE CHANGES (OUTPATIENT)
Dept: CASE MANAGEMENT | Facility: OTHER | Age: 83
End: 2018-07-09

## 2018-07-09 DIAGNOSIS — F41.1 GENERALIZED ANXIETY DISORDER: ICD-10-CM

## 2018-07-09 DIAGNOSIS — F33.0 MDD (MAJOR DEPRESSIVE DISORDER), RECURRENT EPISODE, MILD (HCC): ICD-10-CM

## 2018-07-16 RX ORDER — ALPRAZOLAM 0.5 MG/1
0.5 TABLET ORAL EVERY MORNING
Qty: 30 TABLET | Refills: 0 | Status: SHIPPED | OUTPATIENT
Start: 2018-07-16 | End: 2018-09-04 | Stop reason: SDUPTHER

## 2018-07-16 RX ORDER — ALPRAZOLAM 1 MG/1
1 TABLET ORAL NIGHTLY
Qty: 30 TABLET | Refills: 0 | Status: SHIPPED | OUTPATIENT
Start: 2018-07-16 | End: 2018-09-04 | Stop reason: SDUPTHER

## 2018-09-04 DIAGNOSIS — F33.0 MDD (MAJOR DEPRESSIVE DISORDER), RECURRENT EPISODE, MILD (HCC): ICD-10-CM

## 2018-09-04 DIAGNOSIS — F41.1 GENERALIZED ANXIETY DISORDER: ICD-10-CM

## 2018-09-05 RX ORDER — ALPRAZOLAM 1 MG/1
1 TABLET ORAL NIGHTLY
Qty: 30 TABLET | Refills: 0 | Status: SHIPPED | OUTPATIENT
Start: 2018-09-05 | End: 2018-10-03 | Stop reason: SDUPTHER

## 2018-09-05 RX ORDER — ALPRAZOLAM 0.5 MG/1
0.5 TABLET ORAL EVERY MORNING
Qty: 30 TABLET | Refills: 0 | Status: SHIPPED | OUTPATIENT
Start: 2018-09-05 | End: 2018-10-03 | Stop reason: SDUPTHER

## 2018-09-09 ENCOUNTER — HOSPITAL ENCOUNTER (EMERGENCY)
Facility: HOSPITAL | Age: 83
Discharge: HOME | End: 2018-09-09
Attending: EMERGENCY MEDICINE | Admitting: EMERGENCY MEDICINE

## 2018-09-09 ENCOUNTER — APPOINTMENT (OUTPATIENT)
Dept: GENERAL RADIOLOGY | Facility: HOSPITAL | Age: 83
End: 2018-09-09

## 2018-09-09 VITALS
TEMPERATURE: 98.3 F | HEIGHT: 62 IN | WEIGHT: 109 LBS | DIASTOLIC BLOOD PRESSURE: 68 MMHG | OXYGEN SATURATION: 98 % | BODY MASS INDEX: 20.06 KG/M2 | SYSTOLIC BLOOD PRESSURE: 137 MMHG | RESPIRATION RATE: 15 BRPM | HEART RATE: 89 BPM

## 2018-09-09 DIAGNOSIS — J02.9 VIRAL PHARYNGITIS: Primary | ICD-10-CM

## 2018-09-09 LAB
GLUCOSE BLDC GLUCOMTR-MCNC: 121 MG/DL (ref 70–130)
S PYO AG THROAT QL: NEGATIVE

## 2018-09-09 PROCEDURE — 87880 STREP A ASSAY W/OPTIC: CPT | Performed by: EMERGENCY MEDICINE

## 2018-09-09 PROCEDURE — 71046 X-RAY EXAM CHEST 2 VIEWS: CPT | Performed by: RADIOLOGY

## 2018-09-09 PROCEDURE — 99283 EMERGENCY DEPT VISIT LOW MDM: CPT

## 2018-09-09 PROCEDURE — 82962 GLUCOSE BLOOD TEST: CPT

## 2018-09-09 PROCEDURE — 87081 CULTURE SCREEN ONLY: CPT | Performed by: EMERGENCY MEDICINE

## 2018-09-09 PROCEDURE — 71046 X-RAY EXAM CHEST 2 VIEWS: CPT

## 2018-09-09 NOTE — ED PROVIDER NOTES
Subjective   History of Present Illness  TRIAGE CHIEF COMPLAINT:   Chief Complaint   Patient presents with   • URI         HPI: Mary Rivera   is a 86 y.o. female   who presents to the emergency department complaining of sore throat.  Patient with a history of diabetes, hypertension, anxiety, depression.  Patient reports being ill for the past month with cough and cold symptoms.  She believes she may have some chronic bronchitis.  Her larger concern is her sore throat and hoarse voice which has developed over the past 48 hours.  Patient also feels as if her hearing is decreased although family notes that this is a chronic issue and that she always believes are ears are blocked off with wax however when I evaluated by her doctor for years are always normal on exam.  Family feels she may just need hearing aids.  Patient denies measured fever, vomiting, diarrhea, chest pain, shortness of breath, abdominal pain.  He has not taken anything prior to arrival.            Review of Systems   All other systems reviewed and are negative.      Past Medical History:   Diagnosis Date   • Anxiety    • Depression    • Diabetes (CMS/[facility])        No Known Allergies    Past Surgical History:   Procedure Laterality Date   • HIP FRACTURE SURGERY     • TOE SURGERY         Family History   Problem Relation Age of Onset   • Family history unknown: Yes       Social History     Social History   • Marital status:      Social History Main Topics   • Smoking status: Current Every Day Smoker   • Alcohol use No   • Drug use: No     Other Topics Concern   • Not on file           Objective   Physical Exam        CONSTITUTIONAL: Awake, alert, appears elderly, frail, but comfortable and non-toxic   HENT: Atraumatic, normocephalic, oral mucosa pink and moist, airway patent. Nares patent without drainage. External ears normal.  TMs unremarkable.  Mild erythema of the tonsils and posterior oropharynx.  No exudate.   EYES: Conjunctiva clear, EOMI,  PERRL   NECK: Trachea midline, non-tender, supple   CARDIOVASCULAR: Normal heart rate, Normal rhythm, No murmurs, rubs, gallops   PULMONARY/CHEST: Clear to auscultation, no rhonchi, wheezes, or rales.  Occasional mild cough.  Symmetrical breath sounds. Non-tender.   ABDOMINAL: Non-distended, soft, non-tender - no rebound or guarding. BS normal.   NEUROLOGIC: Non-focal, moving all four extremities, no gross sensory or motor deficits.   EXTREMITIES: No clubbing, cyanosis, or edema   SKIN: Warm, Dry, No erythema, No rash     XR Chest 2 View    (Results Pending)     CXR: No focal infiltrates or evidence of pneumonia.      EKG:           Procedures           ED Course        ED COURSE / MEDICAL DECISION MAKING:   Nursing notes reviewed.    Patient was symptoms of viral pharyngitis.  Rapid strep was negative.  There are no exudates on exam and she is nontoxic in appearance.  Afebrile.  Plan for supportive care and PCP follow-up as needed versus return with any concerns.    DECISION TO DISCHARGE/ADMIT: see ED care timeline       Electronically signed by: Jovani Davey MD, 9/9/2018 1:04 PM                [facility]  Final diagnoses:   Viral pharyngitis            Jovani Davey MD  09/09/18 [id number]

## 2018-09-09 NOTE — ED NOTES
Pt resting quietly on stretcher with no complaints.  Pt AAOx4 with no resp distress noted, respirations even and unlabored.  Pt denies any needs at this time.  Skin PWD.  Pt family at bedside. Will continue to monitor and follow plan of care.  Bed rails up x2, bed in lowest position, call light in reach.       Marsha Lewis RN  09/09/18 1219

## 2018-09-09 NOTE — ED NOTES
Pt reports that she has recently been dealing with sinus complaints and has had sore throat and hoarseness the past couple days.  Pt family at bedside.     Marsha Lewis RN  09/09/18 [id number]

## 2018-09-09 NOTE — ED NOTES
Pt resting quietly on stretcher with no complaints.  Pt AAOx4 with no resp distress noted, respirations even and unlabored.  Pt denies any needs at this time.  Skin PWD.  Pt family at bedside. Will continue to monitor and follow plan of care.  Bed rails up x2, bed in lowest position, call light in reach.       Marsha Lewis RN  09/09/18 [id number]

## 2018-09-10 LAB — BACTERIA SPEC AEROBE CULT: NORMAL

## 2018-09-13 ENCOUNTER — PATIENT OUTREACH (OUTPATIENT)
Dept: CASE MANAGEMENT | Facility: OTHER | Age: 83
End: 2018-09-13

## 2018-09-13 ENCOUNTER — EPISODE CHANGES (OUTPATIENT)
Dept: CASE MANAGEMENT | Facility: OTHER | Age: 83
End: 2018-09-13

## 2018-09-14 NOTE — OUTREACH NOTE
Care Management Plan 9/13/2018   Lifestyle Goals Self monitor blood sugar;Eat a healthy diet   Barriers Disease education   Self Management Home Glucose Monitoring   Annual Wellness Visit:  Care Coordinator Will Schedule   Care Gaps Addressed Colonoscopy;Flu Shot;Pneumonia Vaccine;Mammogram   Specific Disease Process Teaching Diabetes   Does patient have depression diagnosis? No   Advanced Directives: Send Information   Ed Visits past 12 months: 2 or 3   Hospitalizations past 12 months 1   Medication Adherence Medications understood   Goal Progress N/A   Health Literacy Moderate     The main concerns and/or symptoms the patient would like to address are: Outreach with patient's daughter, Josefina, who states patient is doing well.  Daughter reports that patient no longer has diabetes.  She is not sure if patient has had AWV, or when the patient's last mammogram, colonoscopy, or pneumococcal vaccine was.  Reports that patient refuses to take a flu vaccine.  Agrees to receive information on ACP.  Declines MyChart as they do not have a computer.     Education/instruction provided by Care Coordinator: Education regarding ACP.  Care coordinator will contact PCP to check on care gaps needed.    Follow Up Outreach Due: Will continue to monitor and outreach as needed.    Rowan Smith RN

## 2018-09-19 ENCOUNTER — OFFICE VISIT (OUTPATIENT)
Dept: PSYCHIATRY | Facility: CLINIC | Age: 83
End: 2018-09-19

## 2018-09-19 VITALS
HEART RATE: 96 BPM | BODY MASS INDEX: 21.16 KG/M2 | WEIGHT: 115 LBS | TEMPERATURE: 98.7 F | OXYGEN SATURATION: 96 % | SYSTOLIC BLOOD PRESSURE: 150 MMHG | DIASTOLIC BLOOD PRESSURE: 70 MMHG | HEIGHT: 62 IN

## 2018-09-19 DIAGNOSIS — F33.0 MDD (MAJOR DEPRESSIVE DISORDER), RECURRENT EPISODE, MILD (HCC): ICD-10-CM

## 2018-09-19 DIAGNOSIS — F41.1 GENERALIZED ANXIETY DISORDER: ICD-10-CM

## 2018-09-19 PROCEDURE — 99213 OFFICE O/P EST LOW 20 MIN: CPT | Performed by: NURSE PRACTITIONER

## 2018-09-19 RX ORDER — MONTELUKAST SODIUM 10 MG/1
10 TABLET ORAL EVERY EVENING
Refills: 5 | COMMUNITY
Start: 2018-06-20

## 2018-09-19 RX ORDER — BUDESONIDE AND FORMOTEROL FUMARATE DIHYDRATE 160; 4.5 UG/1; UG/1
2 AEROSOL RESPIRATORY (INHALATION) 2 TIMES DAILY
Refills: 1 | COMMUNITY
Start: 2018-07-10

## 2018-09-19 RX ORDER — CEFDINIR 300 MG/1
CAPSULE ORAL
Refills: 0 | COMMUNITY
Start: 2018-08-20

## 2018-09-19 RX ORDER — ALPRAZOLAM 1 MG/1
1 TABLET ORAL NIGHTLY
Qty: 30 TABLET | Refills: 0 | Status: CANCELLED | OUTPATIENT
Start: 2018-09-19

## 2018-09-19 RX ORDER — ALPRAZOLAM 0.5 MG/1
0.5 TABLET ORAL EVERY MORNING
Qty: 30 TABLET | Refills: 0 | Status: CANCELLED | OUTPATIENT
Start: 2018-09-19 | End: 2019-09-19

## 2018-09-19 RX ORDER — CETIRIZINE HYDROCHLORIDE 10 MG/1
10 TABLET ORAL
Refills: 3 | COMMUNITY
Start: 2018-09-11

## 2018-09-19 NOTE — PROGRESS NOTES
"Michael Rivera is a 86 y.o. female who is here today for medication management follow up.    Chief Complaint:  Anxiety and depression    HPI:  History of Present Illness   Patient states that she is doing ok.  She reports she has ongoing periods of anxiety states that her medication does help.  Patient is again focused on increasing her dose of Xanax.  Due to the patient's age history and length of time on Xanax patient does not appear to tolerate further lowering dose.  Patient remains somewhat rambling and confabulating she was able to verbalize her age and immediate surroundings.  She was again noncooperative with memory questions.  Patient was seen alone apparently family members are in the lobby.  Patient remains under 24-hour supervision with a daughter.  Daughter lives with her.  Appetite: appetite is good. Sleeps pretty good.  Patient denies any auditory or visual hallucinations.  She adamantly denied any thoughts to harm herself or others    Review of Systems   Constitutional: Positive for appetite change.   HENT: Positive for congestion, hearing loss and sore throat.    Respiratory: Negative.    Cardiovascular: Negative.    Gastrointestinal: Negative.    Musculoskeletal: Positive for arthralgias, back pain and gait problem.        Patient had difficulty walking to the exam room.      Neurological: Positive for weakness.       Objective   Physical Exam   Constitutional: She appears well-developed and well-nourished. No distress.   Neurological: She is alert. Coordination abnormal. Gait normal.   Skin: Skin is warm and dry.   Vitals reviewed.    Blood pressure 150/70, pulse 96, temperature 98.7 °F (37.1 °C), temperature source Temporal Artery , height 157.5 cm (62\"), weight 52.2 kg (115 lb), SpO2 96 %.    No Known Allergies    Current Medications:   Current Outpatient Prescriptions   Medication Sig Dispense Refill   • ALPRAZolam (XANAX) 0.5 MG tablet Take 1 tablet by mouth Every Morning. 30 tablet " 0   • ALPRAZolam (XANAX) 1 MG tablet Take 1 tablet by mouth Every Night. 30 tablet 0   • amLODIPine (NORVASC) 5 MG tablet TAKE ONE Tablet BY MOUTH EVERY MORNING  2   • amoxicillin (AMOXIL) 500 MG capsule TAKE TWO CAPSULES BY MOUTH TWICE DAILY UNTIL GONE  0   • BESIVANCE 0.6 % suspension ophthalmic suspension USE 1 drop IN surgical EYE THREE TIMES DAILY  1   • Cholecalciferol (VITAMIN D3) 5000 units capsule capsule TAKE ONE CAPSULE BY MOUTH EVERY DAY  2   • ciprofloxacin (CIPRO) 250 MG tablet TAKE ONE TABLET BY MOUTH EVERY TWELVE HOURS UNTIL GONE  0   • Diclofenac Sodium 1.5 % solution apply 10 TO 20 DROPS TO THE affected area(S) UP TO 5 TIMES PER DAY AS NEEDED AS DIRECTED  5   • DUREZOL 0.05 % ophthalmic emulsion USE 1 drop IN surgical EYE THREE TIMES DAILY  1   • fluticasone (FLONASE) 50 MCG/ACT nasal spray USE two SPRAYS IN EACH nostril EVERY DAY  0   • HYDROcodone-acetaminophen (NORCO) 5-325 MG per tablet TAKE 1/2 TO 1 TABLET BY MOUTH THREE TIMES DAILY AS NEEDED FOR PAIN  0   • ILEVRO 0.3 % suspension USE 1 drop IN surgical EYE EVERY DAY  1   • lidocaine (XYLOCAINE) 5 % ointment APPLY TO THE AFFECTED AREA 3 TO 4 TIMES PER DAY AS directed  5   • loratadine (CLARITIN) 10 MG tablet TAKE 1 Tablet BY MOUTH EVERY DAY  2   • MAPAP 500 MG tablet TAKE 1 Tablet BY MOUTH EVERY DAY AS NEEDED FOR PAIN  0   • megestrol (MEGACE) 40 MG/ML suspension TAKE 2 teaspoons BY MOUTH TWICE DAILY  2   • prednisoLONE acetate (PRED FORTE) 1 % ophthalmic suspension instill 1 drop into surgical EYE FOUR TIMES DAILY AS directed  0   • promethazine (PHENERGAN) 25 MG tablet TAKE 1/2 TO 1 tablet BY MOUTH EVERY 4 TO 6 HOURS AS NEEDED  0   • raNITIdine (ZANTAC) 300 MG tablet TAKE ONE TABLET BY MOUTH TWICE DAILY  2     No current facility-administered medications for this visit.        Mental Status Exam:   Appearance: Neatly, casually dressed, good hygeine.   Cooperation: Cooperative  Orientation: Ox4  Gait and station stable.   Psychomotor: No  "psychomotor agitation/retardation, No EPS, No motor tics  Speech: normal rate, amount.  Mood \"pretty good\"   Affect: congruent/appropriate.  Thought Content: goal directed, no delusional material present  Thought process: linear, organized.  Suicidality: No SI  Homicidality: No HI   Perception: No AH/VH  Memory: some immediate memory appears to impaired.  Concentration: good  Impulse control: good  Insight: fair   Judgement: fair    Assessment/Plan   Problems Addressed this Visit     None      Visit Diagnoses     Generalized anxiety disorder        MDD (major depressive disorder), recurrent episode, mild (CMS/HCC)            Patient reports that her anxiety has been much worse of no blood pressure and pulse are somewhat elevated patient is requesting to return to her original dosing of Xanax will obtain further information [place] etc. and in Rx will be sent in when due.  Explanation was given to patient regarding plan.  Return in about 3 months (around 12/19/2018).  The patient was instructed to call clinic as needed or go to ER if in crisis.           Xenia disclaimer:  Much of this encounter note is an electronic transcription/translation of spoken language to printed text. The electronic translation of spoken language may permit erroneous, or at times, nonsensical words or phrases to be inadvertently transcribed; Although I have reviewed the note for such errors, some may still exist.  "

## 2018-10-03 DIAGNOSIS — F33.0 MDD (MAJOR DEPRESSIVE DISORDER), RECURRENT EPISODE, MILD (HCC): ICD-10-CM

## 2018-10-03 DIAGNOSIS — F41.1 GENERALIZED ANXIETY DISORDER: ICD-10-CM

## 2018-10-03 RX ORDER — ALPRAZOLAM 0.5 MG/1
0.5 TABLET ORAL EVERY MORNING
Qty: 30 TABLET | Refills: 0 | Status: SHIPPED | OUTPATIENT
Start: 2018-10-03 | End: 2018-11-05 | Stop reason: SDUPTHER

## 2018-10-03 RX ORDER — ALPRAZOLAM 1 MG/1
1 TABLET ORAL NIGHTLY
Qty: 30 TABLET | Refills: 0 | Status: SHIPPED | OUTPATIENT
Start: 2018-10-03 | End: 2018-11-05 | Stop reason: SDUPTHER

## 2018-11-05 DIAGNOSIS — F41.1 GENERALIZED ANXIETY DISORDER: ICD-10-CM

## 2018-11-05 DIAGNOSIS — F33.0 MDD (MAJOR DEPRESSIVE DISORDER), RECURRENT EPISODE, MILD (HCC): ICD-10-CM

## 2018-11-05 RX ORDER — ALPRAZOLAM 1 MG/1
1 TABLET ORAL NIGHTLY
Qty: 30 TABLET | Refills: 0 | Status: SHIPPED | OUTPATIENT
Start: 2018-11-05 | End: 2018-12-04 | Stop reason: SDUPTHER

## 2018-11-05 RX ORDER — ALPRAZOLAM 0.5 MG/1
0.5 TABLET ORAL EVERY MORNING
Qty: 30 TABLET | Refills: 0 | Status: SHIPPED | OUTPATIENT
Start: 2018-11-05 | End: 2018-12-04 | Stop reason: SDUPTHER

## 2018-12-04 DIAGNOSIS — F41.1 GENERALIZED ANXIETY DISORDER: ICD-10-CM

## 2018-12-04 DIAGNOSIS — F33.0 MDD (MAJOR DEPRESSIVE DISORDER), RECURRENT EPISODE, MILD (HCC): ICD-10-CM

## 2018-12-04 RX ORDER — ALPRAZOLAM 0.5 MG/1
0.5 TABLET ORAL EVERY MORNING
Qty: 30 TABLET | Refills: 0 | Status: SHIPPED | OUTPATIENT
Start: 2018-12-04 | End: 2018-12-19

## 2018-12-04 RX ORDER — ALPRAZOLAM 1 MG/1
1 TABLET ORAL NIGHTLY
Qty: 30 TABLET | Refills: 0 | Status: SHIPPED | OUTPATIENT
Start: 2018-12-04 | End: 2018-12-06

## 2018-12-06 DIAGNOSIS — F41.1 GENERALIZED ANXIETY DISORDER: ICD-10-CM

## 2018-12-06 DIAGNOSIS — F33.0 MDD (MAJOR DEPRESSIVE DISORDER), RECURRENT EPISODE, MILD (HCC): ICD-10-CM

## 2018-12-06 RX ORDER — ALPRAZOLAM 1 MG/1
1 TABLET ORAL NIGHTLY
Qty: 30 TABLET | Refills: 0 | Status: SHIPPED | OUTPATIENT
Start: 2018-12-06 | End: 2018-12-19

## 2018-12-19 ENCOUNTER — OFFICE VISIT (OUTPATIENT)
Dept: PSYCHIATRY | Facility: CLINIC | Age: 83
End: 2018-12-19

## 2018-12-19 VITALS
HEIGHT: 62 IN | BODY MASS INDEX: 21.16 KG/M2 | SYSTOLIC BLOOD PRESSURE: 140 MMHG | DIASTOLIC BLOOD PRESSURE: 70 MMHG | WEIGHT: 115 LBS

## 2018-12-19 DIAGNOSIS — F41.1 GENERALIZED ANXIETY DISORDER: Primary | ICD-10-CM

## 2018-12-19 DIAGNOSIS — F33.0 MDD (MAJOR DEPRESSIVE DISORDER), RECURRENT EPISODE, MILD (HCC): ICD-10-CM

## 2018-12-19 PROCEDURE — 99214 OFFICE O/P EST MOD 30 MIN: CPT | Performed by: NURSE PRACTITIONER

## 2018-12-19 RX ORDER — ALPRAZOLAM 1 MG/1
1 TABLET ORAL NIGHTLY
Qty: 30 TABLET | Refills: 0 | Status: SHIPPED | OUTPATIENT
Start: 2018-12-19 | End: 2019-02-06 | Stop reason: SDUPTHER

## 2018-12-19 RX ORDER — ALPRAZOLAM 0.5 MG/1
0.5 TABLET ORAL EVERY MORNING
Qty: 45 TABLET | Refills: 0 | Status: SHIPPED | OUTPATIENT
Start: 2018-12-19 | End: 2019-02-02

## 2018-12-19 RX ORDER — DOXEPIN HYDROCHLORIDE 3 MG/1
3 TABLET ORAL NIGHTLY
Qty: 30 TABLET | Refills: 0 | Status: SHIPPED | OUTPATIENT
Start: 2018-12-19

## 2018-12-19 NOTE — PROGRESS NOTES
"Michael Rivera is a 86 y.o. female who is here today for medication management follow up.    Chief Complaint:  Anxiety and depression    HPI:  History of Present Illness patient was seen today with her daughter daughter reports that she is generally independent at home with medications daughter reports that she is having great difficulty with anxiety and sleep.  Daughter reports that she is often restless very anxious and almost hyperactive.  Daughter also reports that she is very independent and generally does all her ADLs manages her own monies and medications.  Patient states that she is doing ok-she is also agreeing with daughter that her anxiety appears to be worsening she is having difficulty with sleep.  Patient is again focused on increasing her dose of Xanax.  Due to the patient's age history and length of time on Xanax patient does not appear to tolerate further lowering dose.  Patient remains somewhat rambling and confabulating she was able to verbalize her age and immediate surroundings.  She was again noncooperative with memory questions.    Appetite: appetite is good. Sleep interrupted..  Patient denies any auditory or visual hallucinations.  She adamantly denied any thoughts to harm herself or others    Review of Systems   Constitutional: Positive for appetite change.   HENT: Positive for congestion, hearing loss and sore throat.    Respiratory: Negative.    Cardiovascular: Negative.    Gastrointestinal: Negative.    Musculoskeletal: Positive for arthralgias, back pain and gait problem.        Patient had difficulty walking to the exam room.      Neurological: Positive for weakness.       Objective   Physical Exam   Constitutional: She appears well-developed and well-nourished. No distress.   Neurological: She is alert. Coordination abnormal. Gait normal.   Skin: Skin is warm and dry.   Vitals reviewed.    Blood pressure 140/70, height 157.5 cm (62\"), weight 52.2 kg (115 lb).    No Known " Allergies    Current Medications:   Current Outpatient Medications   Medication Sig Dispense Refill   • ALPRAZolam (XANAX) 0.5 MG tablet Take 1 tablet by mouth Every Morning. 30 tablet 0   • ALPRAZolam (XANAX) 1 MG tablet Take 1 tablet by mouth Every Night. 30 tablet 0   • amLODIPine (NORVASC) 5 MG tablet TAKE ONE Tablet BY MOUTH EVERY MORNING  2   • BESIVANCE 0.6 % suspension ophthalmic suspension USE 1 drop IN surgical EYE THREE TIMES DAILY  1   • cetirizine (zyrTEC) 10 MG tablet Take 10 mg by mouth every night at bedtime.  3   • Cholecalciferol (VITAMIN D3) 5000 units capsule capsule TAKE ONE CAPSULE BY MOUTH EVERY DAY  2   • Diclofenac Sodium 1.5 % solution apply 10 TO 20 DROPS TO THE affected area(S) UP TO 5 TIMES PER DAY AS NEEDED AS DIRECTED  5   • DUREZOL 0.05 % ophthalmic emulsion USE 1 drop IN surgical EYE THREE TIMES DAILY  1   • fluticasone (FLONASE) 50 MCG/ACT nasal spray USE two SPRAYS IN EACH nostril EVERY DAY  0   • HYDROcodone-acetaminophen (NORCO) 5-325 MG per tablet TAKE 1/2 TO 1 TABLET BY MOUTH THREE TIMES DAILY AS NEEDED FOR PAIN  0   • ILEVRO 0.3 % suspension USE 1 drop IN surgical EYE EVERY DAY  1   • lidocaine (XYLOCAINE) 5 % ointment APPLY TO THE AFFECTED AREA 3 TO 4 TIMES PER DAY AS directed  5   • MAPAP 500 MG tablet TAKE 1 Tablet BY MOUTH EVERY DAY AS NEEDED FOR PAIN  0   • megestrol (MEGACE) 40 MG/ML suspension TAKE 2 teaspoons BY MOUTH TWICE DAILY  2   • montelukast (SINGULAIR) 10 MG tablet Take 10 mg by mouth Every Evening.  5   • prednisoLONE acetate (PRED FORTE) 1 % ophthalmic suspension instill 1 drop into surgical EYE FOUR TIMES DAILY AS directed  0   • promethazine (PHENERGAN) 25 MG tablet TAKE 1/2 TO 1 tablet BY MOUTH EVERY 4 TO 6 HOURS AS NEEDED  0   • raNITIdine (ZANTAC) 300 MG tablet TAKE ONE TABLET BY MOUTH TWICE DAILY  2   • SYMBICORT 160-4.5 MCG/ACT inhaler Inhale 2 puffs 2 (Two) Times a Day.  1   • cefdinir (OMNICEF) 300 MG capsule TAKE ONE Capsule BY MOUTH EVERY 12 HOURS  "FOR 10 DAYS  0     No current facility-administered medications for this visit.        Mental Status Exam:   Appearance: Neatly, casually dressed, good hygeine.   Cooperation: Cooperative  Orientation: Ox4  Gait and station stable.   Psychomotor: No psychomotor agitation/retardation, No EPS, No motor tics  Speech: normal rate, amount.  Mood \"pretty good\"   Affect: congruent/appropriate.  Thought Content: goal directed, no delusional material present  Thought process: linear, organized.  Suicidality: No SI  Homicidality: No HI   Perception: No AH/VH  Memory: some immediate memory appears to impaired.  Concentration: good  Impulse control: good  Insight: fair   Judgement: fair    Assessment/Plan   Problems Addressed this Visit     None      Visit Diagnoses     Generalized anxiety disorder    -  Primary    MDD (major depressive disorder), recurrent episode, mild (CMS/HCC)            Patient will be continued on Xanax she has been on this medication for 40+ years treatment plan was initiated by previous psychiatrist.  We have attempted lowering doses patient has not tolerated well.  No evidence of abuse diversion or miss appropriate use of Xanax.  Patient's Emil[place] was reviewed will complete a urine drug screen.  Patient is very educated regarding the risk of benzodiazepines and is adamant to continue current course.  Due to patient's age duration of Xanax use does not appear candidate to discontinue or further taper dose.  Patient will be adjusted on her medication reviewed medication regime again patient was able to really verbalize will also give low dose of doxepin for assistance with sleep at night.  Again shared with patient and guardian reluctance to increase Xanax dose.      Patient reports that her anxiety has been much worse of no blood pressure and pulse are somewhat elevated patient is requesting to return to her original dosing of Xanax will obtain further information [place] etc. and in Rx will be sent in " when due.  Explanation was given to patient regarding plan.  Return in about 2 months (around 2/19/2019).  The patient was instructed to call clinic as needed or go to ER if in crisis.           Xenia disclaimer:  Much of this encounter note is an electronic transcription/translation of spoken language to printed text. The electronic translation of spoken language may permit erroneous, or at times, nonsensical words or phrases to be inadvertently transcribed; Although I have reviewed the note for such errors, some may still exist.

## 2019-01-07 DIAGNOSIS — M25.552 BILATERAL HIP PAIN: Primary | ICD-10-CM

## 2019-01-07 DIAGNOSIS — M25.551 BILATERAL HIP PAIN: Primary | ICD-10-CM

## 2019-01-07 DIAGNOSIS — F41.1 GENERALIZED ANXIETY DISORDER: ICD-10-CM

## 2019-01-07 DIAGNOSIS — F33.0 MDD (MAJOR DEPRESSIVE DISORDER), RECURRENT EPISODE, MILD (HCC): ICD-10-CM

## 2019-01-08 RX ORDER — ALPRAZOLAM 1 MG/1
1 TABLET ORAL NIGHTLY
Qty: 30 TABLET | Refills: 0 | OUTPATIENT
Start: 2019-01-08

## 2019-01-08 RX ORDER — ALPRAZOLAM 0.5 MG/1
0.5 TABLET ORAL 2 TIMES DAILY
Qty: 60 TABLET | Refills: 0 | OUTPATIENT
Start: 2019-01-08

## 2019-01-09 ENCOUNTER — APPOINTMENT (OUTPATIENT)
Dept: GENERAL RADIOLOGY | Facility: HOSPITAL | Age: 84
End: 2019-01-09
Attending: ORTHOPAEDIC SURGERY

## 2019-01-28 ENCOUNTER — HOSPITAL ENCOUNTER (OUTPATIENT)
Dept: GENERAL RADIOLOGY | Facility: HOSPITAL | Age: 84
Discharge: HOME | End: 2019-01-28
Attending: ORTHOPAEDIC SURGERY | Admitting: ORTHOPAEDIC SURGERY

## 2019-01-28 ENCOUNTER — OFFICE VISIT (OUTPATIENT)
Dept: ORTHOPEDIC SURGERY | Facility: CLINIC | Age: 84
End: 2019-01-28

## 2019-01-28 VITALS — HEIGHT: 62 IN | WEIGHT: 115 LBS | BODY MASS INDEX: 21.16 KG/M2

## 2019-01-28 DIAGNOSIS — M25.552 BILATERAL HIP PAIN: ICD-10-CM

## 2019-01-28 DIAGNOSIS — M54.42 CHRONIC BILATERAL LOW BACK PAIN WITH BILATERAL SCIATICA: Primary | ICD-10-CM

## 2019-01-28 DIAGNOSIS — M25.551 BILATERAL HIP PAIN: ICD-10-CM

## 2019-01-28 DIAGNOSIS — M54.41 CHRONIC BILATERAL LOW BACK PAIN WITH BILATERAL SCIATICA: Primary | ICD-10-CM

## 2019-01-28 DIAGNOSIS — G89.29 CHRONIC BILATERAL LOW BACK PAIN WITH BILATERAL SCIATICA: Primary | ICD-10-CM

## 2019-01-28 PROCEDURE — 73522 X-RAY EXAM HIPS BI 3-4 VIEWS: CPT

## 2019-01-28 PROCEDURE — 99203 OFFICE O/P NEW LOW 30 MIN: CPT | Performed by: ORTHOPAEDIC SURGERY

## 2019-01-28 PROCEDURE — 73522 X-RAY EXAM HIPS BI 3-4 VIEWS: CPT | Performed by: RADIOLOGY

## 2019-01-28 NOTE — PROGRESS NOTES
New Patient Visit      Patient: Mary Rivera  YOB: 1932  Date of Encounter: 01/28/2019        Chief Complaint:   Chief Complaint   Patient presents with   • Left Hip - Pain   • Right Hip - Pain       HPI:   Mary Rivera, 86 y.o. female presents today upon the referral of Harpreet Sequeira PA-C evaluation of bilateral posterior hip pain.  Her daughter is with her today.  She is complaining of constant bilateral posterior hip pain which prevents her from sleeping she has tremendous difficulty getting up and down from a seated position.  She denies recent injury.  She did sustain a right hip fracture about 10 years ago and underwent hip replacement at [facility].  She denies pain in her upper or mid back.  She complains pain radiating down the back of both legs.  She does not specifically complain of anterior hip pain or knee pain.  She denies significant weakness or numbness in her legs and denies bowel or bladder incontinence.  She has been referred to pain management the past but was not successful obtaining any relief.  She is requesting narcotic pain medication but reports that she cannot travel due to the limitations of her daughter.    Active Problem List:  Patient Active Problem List   Diagnosis   • Bilateral hip pain   • Hypertension   • Diabetes (CMS/HCC)       Past Medical History:  Past Medical History:   Diagnosis Date   • Anxiety    • Depression    • Diabetes (CMS/HCC)    • Hypertension    • Osteoarthritis        Past Surgical History:  Past Surgical History:   Procedure Laterality Date   • HIP FRACTURE SURGERY     • TOE SURGERY         Family History:  Family History   Problem Relation Age of Onset   • Osteoarthritis Mother    • Osteoporosis Mother        Social History:  Social History     Socioeconomic History   • Marital status:      Spouse name: Not on file   • Number of children: Not on file   • Years of education: Not on file   • Highest education level: Not on file    Social Needs   • Financial resource strain: Not on file   • Food insecurity - worry: Not on file   • Food insecurity - inability: Not on file   • Transportation needs - medical: Not on file   • Transportation needs - non-medical: Not on file   Occupational History   • Not on file   Tobacco Use   • Smoking status: Never Smoker   • Smokeless tobacco: Never Used   Substance and Sexual Activity   • Alcohol use: No   • Drug use: No   • Sexual activity: Not on file   Other Topics Concern   • Not on file   Social History Narrative   • Not on file     Patient's Body mass index is 21.03 kg/m². BMI is within normal parameters. No follow-up required..      Medications:  Current Outpatient Medications   Medication Sig Dispense Refill   • ALPRAZolam (XANAX) 0.5 MG tablet Take 1 tablet by mouth Every Morning for 45 days. May take additional tablet at 2pm if needed 45 tablet 0   • ALPRAZolam (XANAX) 1 MG tablet Take 1 tablet by mouth Every Night. 30 tablet 0   • amLODIPine (NORVASC) 5 MG tablet TAKE ONE Tablet BY MOUTH EVERY MORNING  2   • BESIVANCE 0.6 % suspension ophthalmic suspension USE 1 drop IN surgical EYE THREE TIMES DAILY  1   • cefdinir (OMNICEF) 300 MG capsule TAKE ONE Capsule BY MOUTH EVERY 12 HOURS FOR 10 DAYS  0   • cetirizine (zyrTEC) 10 MG tablet Take 10 mg by mouth every night at bedtime.  3   • Cholecalciferol (VITAMIN D3) 5000 units capsule capsule TAKE ONE CAPSULE BY MOUTH EVERY DAY  2   • Diclofenac Sodium 1.5 % solution apply 10 TO 20 DROPS TO THE affected area(S) UP TO 5 TIMES PER DAY AS NEEDED AS DIRECTED  5   • Doxepin HCl 3 MG tablet Take 3 mg by mouth Every Night. 30 tablet 0   • DUREZOL 0.05 % ophthalmic emulsion USE 1 drop IN surgical EYE THREE TIMES DAILY  1   • fluticasone (FLONASE) 50 MCG/ACT nasal spray USE two SPRAYS IN EACH nostril EVERY DAY  0   • HYDROcodone-acetaminophen (NORCO) 5-325 MG per tablet TAKE 1/2 TO 1 TABLET BY MOUTH THREE TIMES DAILY AS NEEDED FOR PAIN  0   • ILEVRO 0.3 %  "suspension USE 1 drop IN surgical EYE EVERY DAY  1   • lidocaine (XYLOCAINE) 5 % ointment APPLY TO THE AFFECTED AREA 3 TO 4 TIMES PER DAY AS directed  5   • MAPAP 500 MG tablet TAKE 1 Tablet BY MOUTH EVERY DAY AS NEEDED FOR PAIN  0   • megestrol (MEGACE) 40 MG/ML suspension TAKE 2 teaspoons BY MOUTH TWICE DAILY  2   • montelukast (SINGULAIR) 10 MG tablet Take 10 mg by mouth Every Evening.  5   • prednisoLONE acetate (PRED FORTE) 1 % ophthalmic suspension instill 1 drop into surgical EYE FOUR TIMES DAILY AS directed  0   • promethazine (PHENERGAN) 25 MG tablet TAKE 1/2 TO 1 tablet BY MOUTH EVERY 4 TO 6 HOURS AS NEEDED  0   • raNITIdine (ZANTAC) 300 MG tablet TAKE ONE TABLET BY MOUTH TWICE DAILY  2   • SYMBICORT 160-4.5 MCG/ACT inhaler Inhale 2 puffs 2 (Two) Times a Day.  1     No current facility-administered medications for this visit.        Allergies:  No Known Allergies    Review of Systems:   Review of Systems   Constitutional: Negative.    HENT: Negative.    Eyes: Negative.    Respiratory: Positive for apnea.    Cardiovascular: Positive for leg swelling.   Gastrointestinal: Positive for constipation.   Endocrine: Negative.    Genitourinary: Negative.    Musculoskeletal: Positive for arthralgias, back pain and joint swelling.   Skin: Negative.    Allergic/Immunologic: Negative.    Neurological: Negative.    Hematological: Negative.    Psychiatric/Behavioral: Positive for confusion, hallucinations and sleep disturbance. The patient is nervous/anxious.        Physical Exam:   Physical Exam  GENERAL: 86 y.o. female, alert and oriented X 3 in no acute distress.   Visit Vitals  Ht 157.5 cm (62\")   Wt 52.2 kg (115 lb)   BMI 21.03 kg/m²     Musculoskeletal:   Dorsolumbar spine examination shows large curve apex to the left.  She  has generalized tenderness in this region.  She has no tenderness localized to the lateral aspect of either hip, demonstrates flexion to 90° with good internal and external rotation of both " hips without significant pain.  She has bilateral knee flexion contractures and further flexes both knees 90°.  She has moderate valgus alignment to her right knee.    Radiology/Labs:     Radiographs reviewed AP of her pelvis shows bipolar cemented hemiarthroplasty on the right without obvious loosening.  She has mild degenerative changes to the left hip.  Her scoliosis is obvious with deviation of the lumbar spine to the left on the AP of her pelvis.    Assessment & Plan:   86 y.o. female with significant thoracolumbar curve likely generating her posterior hip pain bilaterally.  I do not think that her pain is originating from true hip pathology.  We discussed her options.  Her daughter again voiced her concerns about being referred elsewhere.  Unfortunately if she is seeking narcotic she will need the be under the auspices of pain management.  She will follow-up here on an as-needed basis.      ICD-10-CM ICD-9-CM   1. Chronic bilateral low back pain with bilateral sciatica M54.42 724.2    M54.41 724.3    G89.29 338.29   2. Bilateral hip pain M25.551 719.45    M25.552                Cc:   Harpreet Sequeira PA-C          Scribed for Zac Moses MD by Karmen Moses RN.3:28 PM 01/28/2019

## 2019-01-31 PROBLEM — I10 HYPERTENSION: Status: ACTIVE | Noted: 2019-01-31

## 2019-01-31 PROBLEM — M25.552 BILATERAL HIP PAIN: Status: ACTIVE | Noted: 2019-01-31

## 2019-01-31 PROBLEM — E11.9 DIABETES (HCC): Status: ACTIVE | Noted: 2019-01-31

## 2019-01-31 PROBLEM — M25.551 BILATERAL HIP PAIN: Status: ACTIVE | Noted: 2019-01-31

## 2019-02-06 DIAGNOSIS — F33.0 MDD (MAJOR DEPRESSIVE DISORDER), RECURRENT EPISODE, MILD (HCC): ICD-10-CM

## 2019-02-06 DIAGNOSIS — F41.1 GENERALIZED ANXIETY DISORDER: ICD-10-CM

## 2019-02-06 RX ORDER — ALPRAZOLAM 1 MG/1
1 TABLET ORAL NIGHTLY
Qty: 30 TABLET | Refills: 0 | Status: SHIPPED | OUTPATIENT
Start: 2019-02-06 | End: 2019-03-04 | Stop reason: SDUPTHER

## 2019-03-04 DIAGNOSIS — F41.1 GENERALIZED ANXIETY DISORDER: ICD-10-CM

## 2019-03-04 DIAGNOSIS — F33.0 MDD (MAJOR DEPRESSIVE DISORDER), RECURRENT EPISODE, MILD (HCC): ICD-10-CM

## 2019-03-04 RX ORDER — ALPRAZOLAM 1 MG/1
1 TABLET ORAL NIGHTLY
Qty: 30 TABLET | Refills: 0 | Status: SHIPPED | OUTPATIENT
Start: 2019-03-04 | End: 2019-03-20

## 2019-03-20 ENCOUNTER — OFFICE VISIT (OUTPATIENT)
Dept: PSYCHIATRY | Facility: CLINIC | Age: 84
End: 2019-03-20

## 2019-03-20 VITALS
OXYGEN SATURATION: 96 % | HEIGHT: 62 IN | HEART RATE: 81 BPM | TEMPERATURE: 97.8 F | DIASTOLIC BLOOD PRESSURE: 80 MMHG | WEIGHT: 117 LBS | SYSTOLIC BLOOD PRESSURE: 150 MMHG | BODY MASS INDEX: 21.53 KG/M2

## 2019-03-20 DIAGNOSIS — F33.0 MDD (MAJOR DEPRESSIVE DISORDER), RECURRENT EPISODE, MILD (HCC): ICD-10-CM

## 2019-03-20 DIAGNOSIS — F41.1 GENERALIZED ANXIETY DISORDER: Primary | ICD-10-CM

## 2019-03-20 PROCEDURE — 99214 OFFICE O/P EST MOD 30 MIN: CPT | Performed by: NURSE PRACTITIONER

## 2019-03-20 RX ORDER — ALPRAZOLAM 1 MG/1
1 TABLET ORAL NIGHTLY
Qty: 30 TABLET | Refills: 0 | Status: SHIPPED | OUTPATIENT
Start: 2019-03-20 | End: 2019-05-03 | Stop reason: SDUPTHER

## 2019-03-20 NOTE — PROGRESS NOTES
Subjective   Mary Rivera is a 86 y.o. female who is here today for medication management follow up.    Chief Complaint:  Anxiety and depression    HPI:  History of Present Illness patient was seen today with her daughter daughter reports that she is generally independent at home with medications daughter reports that she is having great difficulty with anxiety and sleep.  Daughter reports that she is often restless very anxious and almost hyperactive.  Daughter also reports that she is very independent and generally does all her ADLs manages her own monies and medications.  Patient states that she is doing ok-she is also agreeing with daughter that her anxiety appears to be worsening she is having difficulty with sleep.  Patient is taking only xanax at night.  Due to the patient's age history and length of time on Xanax patient does not appear to tolerate further lowering dose.  Patient remains somewhat rambling and confabulating she was able to verbalize her age and immediate surroundings.  She was again noncooperative with memory questions.    Appetite: appetite is good. Sleep interrupted..  Patient denies any auditory or visual hallucinations.  She adamantly denied any thoughts to harm herself or others    Review of Systems   Constitutional: Positive for appetite change.   HENT: Positive for congestion, hearing loss and sore throat.    Respiratory: Negative.    Cardiovascular: Negative.    Gastrointestinal: Negative.    Musculoskeletal: Positive for arthralgias, back pain and gait problem.        Patient had difficulty walking to the exam room.      Neurological: Positive for weakness.       Objective   Physical Exam   Constitutional: She appears well-developed and well-nourished. No distress.   Neurological: She is alert. Coordination abnormal. Gait normal.   Skin: Skin is warm and dry.   Vitals reviewed.    There were no vitals taken for this visit.    No Known Allergies    Current Medications:   Current  Outpatient Medications   Medication Sig Dispense Refill   • ALPRAZolam (XANAX) 1 MG tablet Take 1 tablet by mouth Every Night. 30 tablet 0   • amLODIPine (NORVASC) 5 MG tablet TAKE ONE Tablet BY MOUTH EVERY MORNING  2   • BESIVANCE 0.6 % suspension ophthalmic suspension USE 1 drop IN surgical EYE THREE TIMES DAILY  1   • cefdinir (OMNICEF) 300 MG capsule TAKE ONE Capsule BY MOUTH EVERY 12 HOURS FOR 10 DAYS  0   • cetirizine (zyrTEC) 10 MG tablet Take 10 mg by mouth every night at bedtime.  3   • Cholecalciferol (VITAMIN D3) 5000 units capsule capsule TAKE ONE CAPSULE BY MOUTH EVERY DAY  2   • Diclofenac Sodium 1.5 % solution apply 10 TO 20 DROPS TO THE affected area(S) UP TO 5 TIMES PER DAY AS NEEDED AS DIRECTED  5   • Doxepin HCl 3 MG tablet Take 3 mg by mouth Every Night. 30 tablet 0   • DUREZOL 0.05 % ophthalmic emulsion USE 1 drop IN surgical EYE THREE TIMES DAILY  1   • fluticasone (FLONASE) 50 MCG/ACT nasal spray USE two SPRAYS IN EACH nostril EVERY DAY  0   • HYDROcodone-acetaminophen (NORCO) 5-325 MG per tablet TAKE 1/2 TO 1 TABLET BY MOUTH THREE TIMES DAILY AS NEEDED FOR PAIN  0   • ILEVRO 0.3 % suspension USE 1 drop IN surgical EYE EVERY DAY  1   • lidocaine (XYLOCAINE) 5 % ointment APPLY TO THE AFFECTED AREA 3 TO 4 TIMES PER DAY AS directed  5   • MAPAP 500 MG tablet TAKE 1 Tablet BY MOUTH EVERY DAY AS NEEDED FOR PAIN  0   • megestrol (MEGACE) 40 MG/ML suspension TAKE 2 teaspoons BY MOUTH TWICE DAILY  2   • montelukast (SINGULAIR) 10 MG tablet Take 10 mg by mouth Every Evening.  5   • prednisoLONE acetate (PRED FORTE) 1 % ophthalmic suspension instill 1 drop into surgical EYE FOUR TIMES DAILY AS directed  0   • promethazine (PHENERGAN) 25 MG tablet TAKE 1/2 TO 1 tablet BY MOUTH EVERY 4 TO 6 HOURS AS NEEDED  0   • raNITIdine (ZANTAC) 300 MG tablet TAKE ONE TABLET BY MOUTH TWICE DAILY  2   • SYMBICORT 160-4.5 MCG/ACT inhaler Inhale 2 puffs 2 (Two) Times a Day.  1     No current facility-administered  "medications for this visit.        Mental Status Exam:   Appearance: Neatly, casually dressed, good hygeine.   Cooperation: Cooperative  Orientation: Ox4  Gait and station stable.   Psychomotor: No psychomotor agitation/retardation, No EPS, No motor tics  Speech: normal rate, amount.  Mood \"pretty good\"   Affect: congruent/appropriate.  Thought Content: goal directed, no delusional material present  Thought process: linear, organized.  Suicidality: No SI  Homicidality: No HI   Perception: No AH/VH  Memory: some immediate memory appears to impaired.  Concentration: good  Impulse control: good  Insight: fair   Judgement: fair    Assessment/Plan   Problems Addressed this Visit     None      Visit Diagnoses     Generalized anxiety disorder    -  Primary    Relevant Medications    ALPRAZolam (XANAX) 1 MG tablet    MDD (major depressive disorder), recurrent episode, mild (CMS/HCC)        Relevant Medications    ALPRAZolam (XANAX) 1 MG tablet        Patient will be continued on Xanax she has been on this medication for 40+ years treatment plan was initiated by previous psychiatrist.  We have attempted lowering doses patient has not tolerated well.  No evidence of abuse diversion or miss appropriate use of Xanax.  Patient's [place] was reviewed will complete a urine drug screen.  Patient is very educated regarding the risk of benzodiazepines and is adamant to continue current course.  Due to patient's age duration of Xanax use does not appear candidate to discontinue or further taper dose.  Patient will be adjusted on her medication reviewed medication regime again patient was able to really verbalize will also give low dose of doxepin for assistance with sleep at night.  Again shared with patient and guardian reluctance to increase Xanax dose.      Patient reports that her anxiety has been much worse of no blood pressure and pulse are somewhat elevated patient is requesting to return to her original dosing of Xanax will " obtain further information Emiliano mckeon and in Rx will be sent in when due.  Explanation was given to patient regarding plan.  Return in about 2 months (around 5/20/2019).  The patient was instructed to call clinic as needed or go to ER if in crisis.           Xenia disclaimer:  Much of this encounter note is an electronic transcription/translation of spoken language to printed text. The electronic translation of spoken language may permit erroneous, or at times, nonsensical words or phrases to be inadvertently transcribed; Although I have reviewed the note for such errors, some may still exist.

## 2019-03-22 ENCOUNTER — TELEPHONE (OUTPATIENT)
Dept: FAMILY MEDICINE CLINIC | Facility: CLINIC | Age: 84
End: 2019-03-22

## 2019-03-22 NOTE — TELEPHONE ENCOUNTER
Patient's daughter called and wanted to know about you calling her PCP and asking for a derm apt. Is that something that you were going to do?

## 2019-03-25 DIAGNOSIS — L98.9 SKIN ABNORMALITY: Primary | ICD-10-CM

## 2019-05-03 DIAGNOSIS — F41.1 GENERALIZED ANXIETY DISORDER: ICD-10-CM

## 2019-05-03 DIAGNOSIS — F33.0 MDD (MAJOR DEPRESSIVE DISORDER), RECURRENT EPISODE, MILD (HCC): ICD-10-CM

## 2019-05-06 RX ORDER — ALPRAZOLAM 1 MG/1
1 TABLET ORAL NIGHTLY
Qty: 30 TABLET | Refills: 0 | Status: SHIPPED | OUTPATIENT
Start: 2019-05-06 | End: 2019-06-05 | Stop reason: SDUPTHER

## 2019-06-05 DIAGNOSIS — F33.0 MDD (MAJOR DEPRESSIVE DISORDER), RECURRENT EPISODE, MILD (HCC): ICD-10-CM

## 2019-06-05 DIAGNOSIS — F41.1 GENERALIZED ANXIETY DISORDER: ICD-10-CM

## 2019-06-06 RX ORDER — ALPRAZOLAM 1 MG/1
1 TABLET ORAL NIGHTLY
Qty: 30 TABLET | Refills: 0 | Status: SHIPPED | OUTPATIENT
Start: 2019-06-06 | End: 2019-07-08 | Stop reason: SDUPTHER

## 2019-06-19 ENCOUNTER — OFFICE VISIT (OUTPATIENT)
Dept: PSYCHIATRY | Facility: CLINIC | Age: 84
End: 2019-06-19

## 2019-06-19 VITALS
DIASTOLIC BLOOD PRESSURE: 60 MMHG | HEART RATE: 73 BPM | SYSTOLIC BLOOD PRESSURE: 118 MMHG | OXYGEN SATURATION: 92 % | WEIGHT: 111 LBS | TEMPERATURE: 97.5 F | BODY MASS INDEX: 20.3 KG/M2

## 2019-06-19 DIAGNOSIS — F33.0 MDD (MAJOR DEPRESSIVE DISORDER), RECURRENT EPISODE, MILD (HCC): ICD-10-CM

## 2019-06-19 DIAGNOSIS — F41.1 GENERALIZED ANXIETY DISORDER: Primary | ICD-10-CM

## 2019-06-19 PROCEDURE — 99213 OFFICE O/P EST LOW 20 MIN: CPT | Performed by: NURSE PRACTITIONER

## 2019-06-19 NOTE — PROGRESS NOTES
Michael Rivera is a 87 y.o. female is here today for medication management follow-up.    Chief Complaint:  Depression and anxiety     History of Present Illness:   Patient presents today with daughter for follow up for medication management.  Patient states she is doing well however she is fighting off a cold and bronchitis.  Patient states she was not started on any antibiotics however she was given some medicine for sinus relief.  Patient states she still living with her daughter and does not know what she would do without her daughter.  Patient states her mood is about the same.  Patient denies any problems with depression or sadness.  Patient states her anxiety is about the same and still has problems with feeling anxious and nervous at night.  Patient is unable to put anxiety on a scale of 0-10.  Patient denies any panic attacks.  Patient states she sleeps okay some nights than others she is unable to go to sleep.  Patient's daughter reports that she does try to take a Xanax at night to help her sleep.  Patient patient's daughter states that she has been on the Xanax for many years and was unable to lower the dose anymore.  Patient denies any nightmares or bad dreams.  Patient denies any mood swings, irritability, or anger.  Patient states her appetite is good and getting 3 meals per day.  Patient's daughter states she stays very active and is very independent.  Patient states that she gets up and takes care of her house in the morning and is still staying very active.  Patient denies any auditory or visual hallucinations.  Patient adamantly denies any SI or HI.  Patient denies any side effects to medications.  Any medical changes since last visit.    The following portions of the patient's history were reviewed and updated as appropriate: allergies, current medications, past family history, past medical history, past social history, past surgical history and problem list.    Review of Systems    Constitutional: Negative.    HENT: Positive for congestion and sinus pressure.    Respiratory: Negative.    Cardiovascular: Negative.    Musculoskeletal: Positive for arthralgias, back pain and gait problem.   Psychiatric/Behavioral: Positive for sleep disturbance. The patient is nervous/anxious.        Objective   Physical Exam   Constitutional: Vital signs are normal. She appears well-developed and well-nourished. She is cooperative.   Neurological: She is alert.   Psychiatric: Her speech is normal and behavior is normal. Thought content normal. Her mood appears anxious. Cognition and memory are normal.   Vitals reviewed.    Blood pressure 118/60, pulse 73, temperature 97.5 °F (36.4 °C), weight 50.3 kg (111 lb), SpO2 92 %. Body mass index is 20.3 kg/m².    No Known Allergies    Medication List:   Current Outpatient Medications   Medication Sig Dispense Refill   • ALPRAZolam (XANAX) 1 MG tablet Take 1 tablet by mouth Every Night. 30 tablet 0   • amLODIPine (NORVASC) 5 MG tablet TAKE ONE Tablet BY MOUTH EVERY MORNING  2   • BESIVANCE 0.6 % suspension ophthalmic suspension USE 1 drop IN surgical EYE THREE TIMES DAILY  1   • cefdinir (OMNICEF) 300 MG capsule TAKE ONE Capsule BY MOUTH EVERY 12 HOURS FOR 10 DAYS  0   • cetirizine (zyrTEC) 10 MG tablet Take 10 mg by mouth every night at bedtime.  3   • Cholecalciferol (VITAMIN D3) 5000 units capsule capsule TAKE ONE CAPSULE BY MOUTH EVERY DAY  2   • Diclofenac Sodium 1.5 % solution apply 10 TO 20 DROPS TO THE affected area(S) UP TO 5 TIMES PER DAY AS NEEDED AS DIRECTED  5   • Doxepin HCl 3 MG tablet Take 3 mg by mouth Every Night. 30 tablet 0   • DUREZOL 0.05 % ophthalmic emulsion USE 1 drop IN surgical EYE THREE TIMES DAILY  1   • fluticasone (FLONASE) 50 MCG/ACT nasal spray USE two SPRAYS IN EACH nostril EVERY DAY  0   • HYDROcodone-acetaminophen (NORCO) 5-325 MG per tablet TAKE 1/2 TO 1 TABLET BY MOUTH THREE TIMES DAILY AS NEEDED FOR PAIN  0   • ILEVRO 0.3 %  suspension USE 1 drop IN surgical EYE EVERY DAY  1   • lidocaine (XYLOCAINE) 5 % ointment APPLY TO THE AFFECTED AREA 3 TO 4 TIMES PER DAY AS directed  5   • MAPAP 500 MG tablet TAKE 1 Tablet BY MOUTH EVERY DAY AS NEEDED FOR PAIN  0   • megestrol (MEGACE) 40 MG/ML suspension TAKE 2 teaspoons BY MOUTH TWICE DAILY  2   • montelukast (SINGULAIR) 10 MG tablet Take 10 mg by mouth Every Evening.  5   • prednisoLONE acetate (PRED FORTE) 1 % ophthalmic suspension instill 1 drop into surgical EYE FOUR TIMES DAILY AS directed  0   • promethazine (PHENERGAN) 25 MG tablet TAKE 1/2 TO 1 tablet BY MOUTH EVERY 4 TO 6 HOURS AS NEEDED  0   • raNITIdine (ZANTAC) 300 MG tablet TAKE ONE TABLET BY MOUTH TWICE DAILY  2   • SYMBICORT 160-4.5 MCG/ACT inhaler Inhale 2 puffs 2 (Two) Times a Day.  1     No current facility-administered medications for this visit.        Mental Status Exam:   Hygiene:   good  Cooperation:  Cooperative  Eye Contact:  Good  Psychomotor Behavior:  Appropriate  Affect:  Appropriate  Hopelessness: Denies  Speech:  Normal  Thought Process:  Goal directed and Linear  Thought Content:  Normal  Suicidal:  None  Homicidal:  None  Hallucinations:  None  Delusion:  None  Memory:  Intact  Orientation:  Person, Place, Time and Situation  Reliability:  fair  Insight:  Fair  Judgement:  Fair  Impulse Control:  Fair  Physical/Medical Issues:  Yes arthritis           Assessment/Plan   Diagnoses and all orders for this visit:    Generalized anxiety disorder    MDD (major depressive disorder), recurrent episode, mild (CMS/HCC)      Discussed medication options with patient.  Discussed with patient risks, benefits, and side effects of medications.  Discussed with patient long-term risks and consequences of use of benzodiazepines. Patient has been on Xanax for many years started by other provider and has not tolerated lowering the dose. Discussed with Patient healthy sleep hygiene and healthy eating. Discussed with patient if having  any questions or concerns to call the office. Discussed with Patient if having any SI or HI to call 911 or go to the nearest ER. Patient and patient's daughter agreed to current plan.        Follow up in 3 months  Errors in dictation may reflect use of voice recognition software and not all errors in transcription may have been detected prior to signing.              This document has been electronically signed by NICO Joseph   June 19, 2019 1:41 PM

## 2019-07-08 DIAGNOSIS — F33.0 MDD (MAJOR DEPRESSIVE DISORDER), RECURRENT EPISODE, MILD (HCC): ICD-10-CM

## 2019-07-08 DIAGNOSIS — F41.1 GENERALIZED ANXIETY DISORDER: ICD-10-CM

## 2019-07-08 NOTE — TELEPHONE ENCOUNTER
Patient is not a PCP patient here. Could you cover the patient's meds for 1 more month until she can get in with arturo, and arturo gets her [employer] licence's.

## 2019-07-10 RX ORDER — ALPRAZOLAM 1 MG/1
1 TABLET ORAL NIGHTLY
Qty: 30 TABLET | Refills: 0 | Status: SHIPPED | OUTPATIENT
Start: 2019-07-10 | End: 2019-09-12 | Stop reason: SDUPTHER

## 2019-07-12 ENCOUNTER — TELEPHONE (OUTPATIENT)
Dept: PSYCHIATRY | Facility: CLINIC | Age: 84
End: 2019-07-12

## 2019-07-12 ENCOUNTER — OFFICE VISIT (OUTPATIENT)
Dept: PSYCHIATRY | Facility: CLINIC | Age: 84
End: 2019-07-12

## 2019-07-12 VITALS
DIASTOLIC BLOOD PRESSURE: 80 MMHG | TEMPERATURE: 98 F | BODY MASS INDEX: 19.94 KG/M2 | HEART RATE: 77 BPM | SYSTOLIC BLOOD PRESSURE: 160 MMHG | OXYGEN SATURATION: 97 % | WEIGHT: 109 LBS

## 2019-07-12 DIAGNOSIS — F41.1 GENERALIZED ANXIETY DISORDER: Primary | ICD-10-CM

## 2019-07-12 DIAGNOSIS — F33.0 MDD (MAJOR DEPRESSIVE DISORDER), RECURRENT EPISODE, MILD (HCC): ICD-10-CM

## 2019-07-12 PROCEDURE — 99213 OFFICE O/P EST LOW 20 MIN: CPT | Performed by: NURSE PRACTITIONER

## 2019-07-12 NOTE — TELEPHONE ENCOUNTER
Rosie just sent Xanax in on 7/10/19, but patient is wanting to use [facility] in [place] instead of [facility], and the medication cannot be transferred. Can you resend this there please? Thank you

## 2019-07-12 NOTE — PROGRESS NOTES
"Michael Rivera is a 87 y.o. female is here today for medication management follow-up.    Chief Complaint:  Depression and anxiety     History of Present Illness:    Patient presents today with daughter for follow-up for medication management.  Patient states she has been doing okay however she does have some days where she is a little bit more sad.  Patient states no big problem with depression just occasionally she does have sad mood.  Patient reports she still has very high anxiety and states that her \"nerves\" are bad.  Patient reports symptoms of anxiety of being very shaky, constantly worrying, and nervous.  Patient states that she is sleeping good and getting about 6 to 8 hours asleep at night.  Patient denies any bad dreams or nightmares.  Patient states her appetite is good and she is eats at least 3 meals per day.  Patient reports that her blood pressure little bit higher today however she was in a rush to get here this morning due to worrying about being late and did not take her blood pressure pill.  Patient denies any auditory or visual hallucinations.  Patient adamantly denies any SI or HI.  Patient denies any side effects to medications.    The following portions of the patient's history were reviewed and updated as appropriate: allergies, current medications, past family history, past medical history, past social history, past surgical history and problem list.    Review of Systems   Constitutional: Negative.    Respiratory: Negative.    Cardiovascular: Negative.    Psychiatric/Behavioral: Positive for dysphoric mood. The patient is nervous/anxious.        Objective   Physical Exam   Constitutional: She appears well-developed and well-nourished. She is cooperative.   Neurological: She is alert.   Psychiatric: Her speech is normal and behavior is normal. Judgment and thought content normal. Her mood appears anxious. Cognition and memory are normal.   Vitals reviewed.    Blood pressure " 160/80, pulse 77, temperature 98 °F (36.7 °C), temperature source Temporal, weight 49.4 kg (109 lb), SpO2 97 %. Body mass index is 19.94 kg/m².    No Known Allergies    Medication List:   Current Outpatient Medications   Medication Sig Dispense Refill   • ALPRAZolam (XANAX) 1 MG tablet Take 1 tablet by mouth Every Night. 30 tablet 0   • amLODIPine (NORVASC) 5 MG tablet TAKE ONE Tablet BY MOUTH EVERY MORNING  2   • BESIVANCE 0.6 % suspension ophthalmic suspension USE 1 drop IN surgical EYE THREE TIMES DAILY  1   • cefdinir (OMNICEF) 300 MG capsule TAKE ONE Capsule BY MOUTH EVERY 12 HOURS FOR 10 DAYS  0   • cetirizine (zyrTEC) 10 MG tablet Take 10 mg by mouth every night at bedtime.  3   • Cholecalciferol (VITAMIN D3) 5000 units capsule capsule TAKE ONE CAPSULE BY MOUTH EVERY DAY  2   • Diclofenac Sodium 1.5 % solution apply 10 TO 20 DROPS TO THE affected area(S) UP TO 5 TIMES PER DAY AS NEEDED AS DIRECTED  5   • Doxepin HCl 3 MG tablet Take 3 mg by mouth Every Night. 30 tablet 0   • DUREZOL 0.05 % ophthalmic emulsion USE 1 drop IN surgical EYE THREE TIMES DAILY  1   • fluticasone (FLONASE) 50 MCG/ACT nasal spray USE two SPRAYS IN EACH nostril EVERY DAY  0   • HYDROcodone-acetaminophen (NORCO) 5-325 MG per tablet TAKE 1/2 TO 1 TABLET BY MOUTH THREE TIMES DAILY AS NEEDED FOR PAIN  0   • ILEVRO 0.3 % suspension USE 1 drop IN surgical EYE EVERY DAY  1   • lidocaine (XYLOCAINE) 5 % ointment APPLY TO THE AFFECTED AREA 3 TO 4 TIMES PER DAY AS directed  5   • MAPAP 500 MG tablet TAKE 1 Tablet BY MOUTH EVERY DAY AS NEEDED FOR PAIN  0   • megestrol (MEGACE) 40 MG/ML suspension TAKE 2 teaspoons BY MOUTH TWICE DAILY  2   • montelukast (SINGULAIR) 10 MG tablet Take 10 mg by mouth Every Evening.  5   • prednisoLONE acetate (PRED FORTE) 1 % ophthalmic suspension instill 1 drop into surgical EYE FOUR TIMES DAILY AS directed  0   • promethazine (PHENERGAN) 25 MG tablet TAKE 1/2 TO 1 tablet BY MOUTH EVERY 4 TO 6 HOURS AS NEEDED  0   •  raNITIdine (ZANTAC) 300 MG tablet TAKE ONE TABLET BY MOUTH TWICE DAILY  2   • SYMBICORT 160-4.5 MCG/ACT inhaler Inhale 2 puffs 2 (Two) Times a Day.  1     No current facility-administered medications for this visit.        Mental Status Exam:   Hygiene:   good  Cooperation:  Cooperative  Eye Contact:  Good  Psychomotor Behavior:  Appropriate  Affect:  Appropriate  Hopelessness: Denies  Speech:  Normal  Thought Process:  Goal directed and Linear  Thought Content:  Normal  Suicidal:  None  Homicidal:  None  Hallucinations:  None  Delusion:  None  Memory:  Intact  Orientation:  Person, Place, Time and Situation  Reliability:  fair  Insight:  Fair  Judgement:  Fair  Impulse Control:  Fair  Physical/Medical Issues:  No            Assessment/Plan   Diagnoses and all orders for this visit:    Generalized anxiety disorder    MDD (major depressive disorder), recurrent episode, mild (CMS/HCC)          Discussed medication options.  Reviewed the risks, benefits, and side effects of the medications; patient acknowledged and verbally consented. Discussed with patient and daughter risks and consequences associated with long term Benzo use.  Patient and daughter is agreeable to call the office if having any questions or concerns.  Patient is aware to call 911 or go to the nearest ER should begin having SI/HI.     Follow up in 8 weeks.      Errors in dictation may reflect use of voice recognition software and not all errors in transcription may have been detected prior to signing.            This document has been electronically signed by NICO Joseph   July 12, 2019 9:29 AM

## 2019-08-05 DIAGNOSIS — F33.0 MDD (MAJOR DEPRESSIVE DISORDER), RECURRENT EPISODE, MILD (HCC): ICD-10-CM

## 2019-08-05 DIAGNOSIS — F41.1 GENERALIZED ANXIETY DISORDER: ICD-10-CM

## 2019-08-05 RX ORDER — ALPRAZOLAM 1 MG/1
1 TABLET ORAL NIGHTLY
Qty: 30 TABLET | Refills: 0 | Status: CANCELLED | OUTPATIENT
Start: 2019-08-05

## 2019-09-12 ENCOUNTER — OFFICE VISIT (OUTPATIENT)
Dept: PSYCHIATRY | Facility: CLINIC | Age: 84
End: 2019-09-12

## 2019-09-12 VITALS
WEIGHT: 108 LBS | BODY MASS INDEX: 19.75 KG/M2 | HEART RATE: 82 BPM | DIASTOLIC BLOOD PRESSURE: 68 MMHG | SYSTOLIC BLOOD PRESSURE: 112 MMHG

## 2019-09-12 DIAGNOSIS — F41.1 GENERALIZED ANXIETY DISORDER: ICD-10-CM

## 2019-09-12 DIAGNOSIS — F33.0 MDD (MAJOR DEPRESSIVE DISORDER), RECURRENT EPISODE, MILD (HCC): ICD-10-CM

## 2019-09-12 PROCEDURE — 99213 OFFICE O/P EST LOW 20 MIN: CPT | Performed by: NURSE PRACTITIONER

## 2019-09-12 RX ORDER — ALPRAZOLAM 1 MG/1
1 TABLET ORAL NIGHTLY
Qty: 30 TABLET | Refills: 0 | Status: SHIPPED | OUTPATIENT
Start: 2019-09-12

## 2019-09-12 NOTE — PROGRESS NOTES
"Michael Rivera is a 87 y.o. female is here today for medication management follow-up.    Chief Complaint:  Anxiety and depression     History of Present Illness:    Patient presents today for follow-up for medication management with daughter.  Patient states she is doing okay however recently had a kidney infection.  Patient states her mood has been okay and denies any depression or sadness.  Daughter denies any mood swings or irritability.  Patient states her anxiety is about the same and states there are times that her \"nerves \"are bad.  Patient states medication helps with her anxiety however still has times where she constantly worries, feels nervous, and is very shaky.  Patient denies any panic attacks.  Patient states she still having some nights where she tosses and turns however most nights she gets at least 6 to 8 hours of sleep at night.  Patient denies any nightmares and states that she prays every night before bed.  Patient states her appetite is good and eating at least 3 meals per day.  Patient denies any auditory or visual hallucinations.  Patient adamantly denies any SI or HI.  Denies any side effects to medications.  The following portions of the patient's history were reviewed and updated as appropriate: allergies, current medications, past family history, past medical history, past social history, past surgical history and problem list.    Review of Systems   Constitutional: Negative.    Respiratory: Negative.    Cardiovascular: Negative.    Psychiatric/Behavioral: Positive for sleep disturbance. The patient is nervous/anxious.        Objective   Physical Exam   Constitutional: Vital signs are normal. She appears well-developed and well-nourished. She is cooperative.   Neurological: She is alert.   Psychiatric: Her speech is normal and behavior is normal. Judgment and thought content normal. Her mood appears anxious. Cognition and memory are normal.   Vitals reviewed.    Blood " pressure 112/68, pulse 82, weight 49 kg (108 lb). Body mass index is 19.75 kg/m².    No Known Allergies    Medication List:   Current Outpatient Medications   Medication Sig Dispense Refill   • ALPRAZolam (XANAX) 1 MG tablet Take 1 tablet by mouth Every Night. 30 tablet 0   • amLODIPine (NORVASC) 5 MG tablet TAKE ONE Tablet BY MOUTH EVERY MORNING  2   • BESIVANCE 0.6 % suspension ophthalmic suspension USE 1 drop IN surgical EYE THREE TIMES DAILY  1   • cefdinir (OMNICEF) 300 MG capsule TAKE ONE Capsule BY MOUTH EVERY 12 HOURS FOR 10 DAYS  0   • cetirizine (zyrTEC) 10 MG tablet Take 10 mg by mouth every night at bedtime.  3   • Cholecalciferol (VITAMIN D3) 5000 units capsule capsule TAKE ONE CAPSULE BY MOUTH EVERY DAY  2   • Diclofenac Sodium 1.5 % solution apply 10 TO 20 DROPS TO THE affected area(S) UP TO 5 TIMES PER DAY AS NEEDED AS DIRECTED  5   • Doxepin HCl 3 MG tablet Take 3 mg by mouth Every Night. 30 tablet 0   • DUREZOL 0.05 % ophthalmic emulsion USE 1 drop IN surgical EYE THREE TIMES DAILY  1   • fluticasone (FLONASE) 50 MCG/ACT nasal spray USE two SPRAYS IN EACH nostril EVERY DAY  0   • HYDROcodone-acetaminophen (NORCO) 5-325 MG per tablet TAKE 1/2 TO 1 TABLET BY MOUTH THREE TIMES DAILY AS NEEDED FOR PAIN  0   • ILEVRO 0.3 % suspension USE 1 drop IN surgical EYE EVERY DAY  1   • lidocaine (XYLOCAINE) 5 % ointment APPLY TO THE AFFECTED AREA 3 TO 4 TIMES PER DAY AS directed  5   • MAPAP 500 MG tablet TAKE 1 Tablet BY MOUTH EVERY DAY AS NEEDED FOR PAIN  0   • megestrol (MEGACE) 40 MG/ML suspension TAKE 2 teaspoons BY MOUTH TWICE DAILY  2   • montelukast (SINGULAIR) 10 MG tablet Take 10 mg by mouth Every Evening.  5   • prednisoLONE acetate (PRED FORTE) 1 % ophthalmic suspension instill 1 drop into surgical EYE FOUR TIMES DAILY AS directed  0   • promethazine (PHENERGAN) 25 MG tablet TAKE 1/2 TO 1 tablet BY MOUTH EVERY 4 TO 6 HOURS AS NEEDED  0   • raNITIdine (ZANTAC) 300 MG tablet TAKE ONE TABLET BY MOUTH  TWICE DAILY  2   • SYMBICORT 160-4.5 MCG/ACT inhaler Inhale 2 puffs 2 (Two) Times a Day.  1     No current facility-administered medications for this visit.        Mental Status Exam:   Hygiene:   good  Cooperation:  Cooperative  Eye Contact:  Good  Psychomotor Behavior:  Appropriate  Affect:  Appropriate  Hopelessness: Denies  Speech:  Normal  Thought Process:  Goal directed and Linear  Thought Content:  Normal  Suicidal:  None  Homicidal:  None  Hallucinations:  None  Delusion:  None  Memory:  Intact  Orientation:  Person, Place, Time and Situation  Reliability:  fair  Insight:  Fair  Judgement:  Fair  Impulse Control:  Fair  Physical/Medical Issues:  No                  Assessment/Plan   Diagnoses and all orders for this visit:    Generalized anxiety disorder  -     ALPRAZolam (XANAX) 1 MG tablet; Take 1 tablet by mouth Every Night.    MDD (major depressive disorder), recurrent episode, mild (CMS/HCC)  -     ALPRAZolam (XANAX) 1 MG tablet; Take 1 tablet by mouth Every Night.            Discussed medication options with patient and daughter. Cont. Xanax 1mg.  Reviewed the risks, benefits, and side effects of the medications; patient and daughter acknowledged and verbally consented.  Patient is being prescribed a controlled substance as part of treatment plan. Patient has been educated of appropriate use of the medications, including risk of somnolence, limited ability to drive and/or work safely, and potential for dependence, respiratory depression and overdose. Patient is also informed that the medication are to be used by the patient only- avoid any combined use of ETOH or other substances unless prescribed.  Discussed with patient and daughter regarding risk associated with long-term benzodiazepine use such as dementia and memory loss.  Discussed with patient decreasing and trying to wean off medication.  Patient states she tried to decrease medication few months ago and was unsuccessful. Patient has been on  medication for more than 40 years that was started by a previous psychiatrist.  Patient and daughter is agreeable to call the office with any questions, concerns, or worsening of symptoms.  Patient is aware to call 911 or go to the nearest ER should begin having SI/HI.          Follow up in 8 weeks.     Errors in dictation may reflect use of voice recognition software and not all errors in transcription may have been detected prior to signing.               This document has been electronically signed by NICO Joseph   September 20, 2019 3:06 PM

## 2021-05-28 ENCOUNTER — HOSPITAL ENCOUNTER (INPATIENT)
Dept: HOSPITAL 79 - PROG CARE | Age: 86
LOS: 7 days | Discharge: NURSING, REHAB OR LONG-TERM CARE | DRG: 291 | End: 2021-06-04
Attending: HOSPITALIST | Admitting: INTERNAL MEDICINE
Payer: MEDICARE

## 2021-05-28 VITALS — WEIGHT: 92 LBS | HEIGHT: 65 IN | BODY MASS INDEX: 15.33 KG/M2

## 2021-05-28 DIAGNOSIS — E87.2: ICD-10-CM

## 2021-05-28 DIAGNOSIS — Z88.0: ICD-10-CM

## 2021-05-28 DIAGNOSIS — D53.9: ICD-10-CM

## 2021-05-28 DIAGNOSIS — I95.9: ICD-10-CM

## 2021-05-28 DIAGNOSIS — J96.21: ICD-10-CM

## 2021-05-28 DIAGNOSIS — Z96.643: ICD-10-CM

## 2021-05-28 DIAGNOSIS — N18.30: ICD-10-CM

## 2021-05-28 DIAGNOSIS — I48.20: ICD-10-CM

## 2021-05-28 DIAGNOSIS — Z16.12: ICD-10-CM

## 2021-05-28 DIAGNOSIS — E78.5: ICD-10-CM

## 2021-05-28 DIAGNOSIS — N30.90: ICD-10-CM

## 2021-05-28 DIAGNOSIS — J30.2: ICD-10-CM

## 2021-05-28 DIAGNOSIS — Z82.49: ICD-10-CM

## 2021-05-28 DIAGNOSIS — I42.9: ICD-10-CM

## 2021-05-28 DIAGNOSIS — F03.90: ICD-10-CM

## 2021-05-28 DIAGNOSIS — I13.0: Primary | ICD-10-CM

## 2021-05-28 DIAGNOSIS — Z79.01: ICD-10-CM

## 2021-05-28 DIAGNOSIS — R57.0: ICD-10-CM

## 2021-05-28 DIAGNOSIS — I50.9: ICD-10-CM

## 2021-05-28 DIAGNOSIS — N17.9: ICD-10-CM

## 2021-05-28 DIAGNOSIS — I50.43: ICD-10-CM

## 2021-05-28 DIAGNOSIS — H91.90: ICD-10-CM

## 2021-05-28 DIAGNOSIS — Z66: ICD-10-CM

## 2021-05-28 DIAGNOSIS — F41.9: ICD-10-CM

## 2021-05-28 DIAGNOSIS — E87.6: ICD-10-CM

## 2021-05-28 DIAGNOSIS — E44.0: ICD-10-CM

## 2021-05-28 DIAGNOSIS — J90: ICD-10-CM

## 2021-05-28 DIAGNOSIS — B96.20: ICD-10-CM

## 2021-05-28 DIAGNOSIS — Z20.822: ICD-10-CM

## 2021-05-28 DIAGNOSIS — R53.2: ICD-10-CM

## 2021-05-28 PROCEDURE — P9047 ALBUMIN (HUMAN), 25%, 50ML: HCPCS

## 2021-05-28 PROCEDURE — U0002 COVID-19 LAB TEST NON-CDC: HCPCS

## 2021-05-29 LAB
BUN/CREATININE RATIO: 29 (ref 0–10)
HGB BLD-MCNC: 14.3 GM/DL (ref 12.3–15.3)
RED BLOOD COUNT: 4.89 M/UL (ref 4–5.1)
WHITE BLOOD COUNT: 8.6 K/UL (ref 4.5–11)

## 2021-05-30 LAB
BUN/CREATININE RATIO: 30 (ref 0–10)
HGB BLD-MCNC: 14.1 GM/DL (ref 12.3–15.3)
RED BLOOD COUNT: 4.82 M/UL (ref 4–5.1)
WHITE BLOOD COUNT: 11.6 K/UL (ref 4.5–11)

## 2021-05-31 LAB
BUN/CREATININE RATIO: 33 (ref 0–10)
HGB BLD-MCNC: 15.1 GM/DL (ref 12.3–15.3)
RED BLOOD COUNT: 5.19 M/UL (ref 4–5.1)
WHITE BLOOD COUNT: 8.7 K/UL (ref 4.5–11)

## 2021-06-01 LAB
BUN/CREATININE RATIO: 30 (ref 0–10)
HGB BLD-MCNC: 14.6 GM/DL (ref 12.3–15.3)
RED BLOOD COUNT: 5.03 M/UL (ref 4–5.1)
WHITE BLOOD COUNT: 8.2 K/UL (ref 4.5–11)

## 2021-06-02 LAB — BUN/CREATININE RATIO: 31 (ref 0–10)

## 2021-06-03 LAB
BUN/CREATININE RATIO: 30 (ref 0–10)
HGB BLD-MCNC: 14 GM/DL (ref 12.3–15.3)
RED BLOOD COUNT: 4.87 M/UL (ref 4–5.1)
WHITE BLOOD COUNT: 7 K/UL (ref 4.5–11)

## 2021-06-04 LAB — BUN/CREATININE RATIO: 29 (ref 0–10)
